# Patient Record
Sex: MALE | Race: WHITE | NOT HISPANIC OR LATINO | Employment: FULL TIME | URBAN - METROPOLITAN AREA
[De-identification: names, ages, dates, MRNs, and addresses within clinical notes are randomized per-mention and may not be internally consistent; named-entity substitution may affect disease eponyms.]

---

## 2021-01-12 ENCOUNTER — IMMUNIZATIONS (OUTPATIENT)
Dept: FAMILY MEDICINE CLINIC | Facility: HOSPITAL | Age: 36
End: 2021-01-12

## 2021-01-12 DIAGNOSIS — Z23 ENCOUNTER FOR IMMUNIZATION: ICD-10-CM

## 2021-01-12 PROCEDURE — 0011A SARS-COV-2 / COVID-19 MRNA VACCINE (MODERNA) 100 MCG: CPT

## 2021-01-12 PROCEDURE — 91301 SARS-COV-2 / COVID-19 MRNA VACCINE (MODERNA) 100 MCG: CPT

## 2021-02-07 ENCOUNTER — IMMUNIZATIONS (OUTPATIENT)
Dept: FAMILY MEDICINE CLINIC | Facility: HOSPITAL | Age: 36
End: 2021-02-07

## 2021-02-07 DIAGNOSIS — Z23 ENCOUNTER FOR IMMUNIZATION: Primary | ICD-10-CM

## 2021-02-07 PROCEDURE — 91301 SARS-COV-2 / COVID-19 MRNA VACCINE (MODERNA) 100 MCG: CPT

## 2021-02-07 PROCEDURE — 0012A SARS-COV-2 / COVID-19 MRNA VACCINE (MODERNA) 100 MCG: CPT

## 2021-05-25 ENCOUNTER — TRANSCRIBE ORDERS (OUTPATIENT)
Dept: ADMINISTRATIVE | Facility: HOSPITAL | Age: 36
End: 2021-05-25

## 2021-05-25 DIAGNOSIS — R59.0 ENLARGED LYMPH NODE IN NECK: Primary | ICD-10-CM

## 2021-06-02 ENCOUNTER — HOSPITAL ENCOUNTER (OUTPATIENT)
Dept: ULTRASOUND IMAGING | Facility: HOSPITAL | Age: 36
Discharge: HOME | End: 2021-06-02
Payer: COMMERCIAL

## 2021-06-02 DIAGNOSIS — R59.0 ENLARGED LYMPH NODE IN NECK: ICD-10-CM

## 2021-06-02 PROCEDURE — 76536 US EXAM OF HEAD AND NECK: CPT

## 2022-01-17 ENCOUNTER — PREP FOR PROCEDURE (OUTPATIENT)
Dept: OBGYN CLINIC | Facility: OTHER | Age: 37
End: 2022-01-17

## 2022-01-17 DIAGNOSIS — M16.12 PRIMARY OSTEOARTHRITIS OF LEFT HIP: Primary | ICD-10-CM

## 2022-02-14 ENCOUNTER — PREP FOR PROCEDURE (OUTPATIENT)
Dept: OBGYN CLINIC | Facility: OTHER | Age: 37
End: 2022-02-14

## 2022-02-14 DIAGNOSIS — M16.12 PRIMARY OSTEOARTHRITIS OF LEFT HIP: Primary | ICD-10-CM

## 2022-03-01 ENCOUNTER — ANESTHESIA EVENT (OUTPATIENT)
Dept: PERIOP | Facility: HOSPITAL | Age: 37
End: 2022-03-01
Payer: COMMERCIAL

## 2022-03-02 ENCOUNTER — ANESTHESIA (OUTPATIENT)
Dept: PERIOP | Facility: HOSPITAL | Age: 37
End: 2022-03-02
Payer: COMMERCIAL

## 2022-03-02 ENCOUNTER — HOSPITAL ENCOUNTER (OUTPATIENT)
Dept: RADIOLOGY | Facility: HOSPITAL | Age: 37
Setting detail: OUTPATIENT SURGERY
Discharge: HOME | End: 2022-03-02
Payer: COMMERCIAL

## 2022-03-02 ENCOUNTER — HOSPITAL ENCOUNTER (OUTPATIENT)
Facility: HOSPITAL | Age: 37
Setting detail: OUTPATIENT SURGERY
Discharge: HOME | End: 2022-03-03
Attending: ORTHOPAEDIC SURGERY | Admitting: ORTHOPAEDIC SURGERY
Payer: COMMERCIAL

## 2022-03-02 ENCOUNTER — APPOINTMENT (OUTPATIENT)
Dept: RADIOLOGY | Facility: HOSPITAL | Age: 37
End: 2022-03-02
Payer: COMMERCIAL

## 2022-03-02 DIAGNOSIS — M16.12 PRIMARY OSTEOARTHRITIS OF LEFT HIP: Primary | ICD-10-CM

## 2022-03-02 DIAGNOSIS — M16.12 PRIMARY OSTEOARTHRITIS OF LEFT HIP: ICD-10-CM

## 2022-03-02 LAB
ABO GROUP BLD: NORMAL
ABO GROUP BLD: NORMAL
BLD GP AB SCN SERPL QL: NEGATIVE
FLUAV RNA RESP QL NAA+PROBE: NEGATIVE
FLUBV RNA RESP QL NAA+PROBE: NEGATIVE
RH BLD: POSITIVE
RH BLD: POSITIVE
RSV RNA RESP QL NAA+PROBE: NEGATIVE
SARS-COV-2 RNA RESP QL NAA+PROBE: NEGATIVE
SPECIMEN EXPIRATION DATE: NORMAL

## 2022-03-02 PROCEDURE — 86900 BLOOD TYPING SEROLOGIC ABO: CPT | Performed by: ORTHOPAEDIC SURGERY

## 2022-03-02 PROCEDURE — 97163 PT EVAL HIGH COMPLEX 45 MIN: CPT

## 2022-03-02 PROCEDURE — C1776 JOINT DEVICE (IMPLANTABLE): HCPCS | Performed by: ORTHOPAEDIC SURGERY

## 2022-03-02 PROCEDURE — 73501 X-RAY EXAM HIP UNI 1 VIEW: CPT

## 2022-03-02 PROCEDURE — 73502 X-RAY EXAM HIP UNI 2-3 VIEWS: CPT

## 2022-03-02 PROCEDURE — 0241U HB NFCT DS VIR RESP RNA 4 TRGT: CPT | Performed by: ANESTHESIOLOGY

## 2022-03-02 PROCEDURE — 86850 RBC ANTIBODY SCREEN: CPT | Performed by: ORTHOPAEDIC SURGERY

## 2022-03-02 PROCEDURE — 86901 BLOOD TYPING SEROLOGIC RH(D): CPT | Performed by: ORTHOPAEDIC SURGERY

## 2022-03-02 DEVICE — BIOLOX DELTA CERAMIC FEMORAL HEAD +5.0 36MM DIA 12/14 TAPER
Type: IMPLANTABLE DEVICE | Site: HIP | Status: FUNCTIONAL
Brand: BIOLOX DELTA

## 2022-03-02 DEVICE — PINNACLE HIP SOLUTIONS ALTRX POLYETHYLENE ACETABULAR LINER NEUTRAL 36MM ID 54MM OD
Type: IMPLANTABLE DEVICE | Site: HIP | Status: FUNCTIONAL
Brand: PINNACLE ALTRX

## 2022-03-02 DEVICE — CORAIL HIP SYSTEM CEMENTLESS FEMORAL STEM 12/14 AMT 135 DEGREES KA SIZE 12 HA COATED STANDARD COLLAR
Type: IMPLANTABLE DEVICE | Site: HIP | Status: FUNCTIONAL
Brand: CORAIL

## 2022-03-02 DEVICE — PINNACLE POROCOAT ACETABULAR SHELL SECTOR II 54MM OD
Type: IMPLANTABLE DEVICE | Site: HIP | Status: FUNCTIONAL
Brand: PINNACLE POROCOAT

## 2022-03-02 RX ORDER — ASPIRIN 325 MG
325 TABLET ORAL 2 TIMES DAILY
Qty: 60 TABLET | Refills: 0
Start: 2022-03-02 | End: 2022-04-13

## 2022-03-02 RX ORDER — FENTANYL CITRATE/PF 50 MCG/ML
25 SYRINGE (ML) INJECTION
Status: DISCONTINUED | OUTPATIENT
Start: 2022-03-02 | End: 2022-03-02 | Stop reason: HOSPADM

## 2022-03-02 RX ORDER — ONDANSETRON 2 MG/ML
4 INJECTION INTRAMUSCULAR; INTRAVENOUS EVERY 6 HOURS PRN
Status: DISCONTINUED | OUTPATIENT
Start: 2022-03-02 | End: 2022-03-03 | Stop reason: HOSPADM

## 2022-03-02 RX ORDER — GLYCOPYRROLATE 0.2 MG/ML
INJECTION INTRAMUSCULAR; INTRAVENOUS AS NEEDED
Status: DISCONTINUED | OUTPATIENT
Start: 2022-03-02 | End: 2022-03-02

## 2022-03-02 RX ORDER — OXYCODONE HYDROCHLORIDE 5 MG/1
5 TABLET ORAL EVERY 4 HOURS PRN
Status: DISCONTINUED | OUTPATIENT
Start: 2022-03-02 | End: 2022-03-03 | Stop reason: HOSPADM

## 2022-03-02 RX ORDER — METOCLOPRAMIDE HYDROCHLORIDE 5 MG/ML
INJECTION INTRAMUSCULAR; INTRAVENOUS AS NEEDED
Status: DISCONTINUED | OUTPATIENT
Start: 2022-03-02 | End: 2022-03-02

## 2022-03-02 RX ORDER — ONDANSETRON 2 MG/ML
INJECTION INTRAMUSCULAR; INTRAVENOUS AS NEEDED
Status: DISCONTINUED | OUTPATIENT
Start: 2022-03-02 | End: 2022-03-02

## 2022-03-02 RX ORDER — CEFAZOLIN SODIUM 2 G/50ML
2000 SOLUTION INTRAVENOUS ONCE
Status: DISCONTINUED | OUTPATIENT
Start: 2022-03-02 | End: 2022-03-02 | Stop reason: HOSPADM

## 2022-03-02 RX ORDER — FERROUS SULFATE 325(65) MG
325 TABLET ORAL
Status: DISCONTINUED | OUTPATIENT
Start: 2022-03-03 | End: 2022-03-03 | Stop reason: HOSPADM

## 2022-03-02 RX ORDER — MIDAZOLAM HYDROCHLORIDE 2 MG/2ML
INJECTION, SOLUTION INTRAMUSCULAR; INTRAVENOUS AS NEEDED
Status: DISCONTINUED | OUTPATIENT
Start: 2022-03-02 | End: 2022-03-02

## 2022-03-02 RX ORDER — PROPOFOL 10 MG/ML
INJECTION, EMULSION INTRAVENOUS AS NEEDED
Status: DISCONTINUED | OUTPATIENT
Start: 2022-03-02 | End: 2022-03-02

## 2022-03-02 RX ORDER — ACETAMINOPHEN 325 MG/1
650 TABLET ORAL EVERY 6 HOURS PRN
Status: DISCONTINUED | OUTPATIENT
Start: 2022-03-02 | End: 2022-03-03 | Stop reason: HOSPADM

## 2022-03-02 RX ORDER — FENTANYL CITRATE 50 UG/ML
INJECTION, SOLUTION INTRAMUSCULAR; INTRAVENOUS AS NEEDED
Status: DISCONTINUED | OUTPATIENT
Start: 2022-03-02 | End: 2022-03-02

## 2022-03-02 RX ORDER — PROPOFOL 10 MG/ML
INJECTION, EMULSION INTRAVENOUS CONTINUOUS PRN
Status: DISCONTINUED | OUTPATIENT
Start: 2022-03-02 | End: 2022-03-02

## 2022-03-02 RX ORDER — SODIUM CHLORIDE 9 MG/ML
INJECTION, SOLUTION INTRAVENOUS AS NEEDED
Status: DISCONTINUED | OUTPATIENT
Start: 2022-03-02 | End: 2022-03-02 | Stop reason: HOSPADM

## 2022-03-02 RX ORDER — ONDANSETRON 2 MG/ML
4 INJECTION INTRAMUSCULAR; INTRAVENOUS ONCE AS NEEDED
Status: DISCONTINUED | OUTPATIENT
Start: 2022-03-02 | End: 2022-03-02 | Stop reason: HOSPADM

## 2022-03-02 RX ORDER — SODIUM CHLORIDE 9 MG/ML
125 INJECTION, SOLUTION INTRAVENOUS CONTINUOUS
Status: DISCONTINUED | OUTPATIENT
Start: 2022-03-02 | End: 2022-03-03 | Stop reason: HOSPADM

## 2022-03-02 RX ORDER — CEFAZOLIN SODIUM 2 G/50ML
SOLUTION INTRAVENOUS AS NEEDED
Status: DISCONTINUED | OUTPATIENT
Start: 2022-03-02 | End: 2022-03-02

## 2022-03-02 RX ORDER — KETOROLAC TROMETHAMINE 30 MG/ML
15 INJECTION, SOLUTION INTRAMUSCULAR; INTRAVENOUS EVERY 6 HOURS PRN
Status: DISCONTINUED | OUTPATIENT
Start: 2022-03-02 | End: 2022-03-03 | Stop reason: HOSPADM

## 2022-03-02 RX ORDER — TRANEXAMIC ACID 100 MG/ML
INJECTION, SOLUTION INTRAVENOUS AS NEEDED
Status: DISCONTINUED | OUTPATIENT
Start: 2022-03-02 | End: 2022-03-02

## 2022-03-02 RX ORDER — SODIUM CHLORIDE, SODIUM LACTATE, POTASSIUM CHLORIDE, CALCIUM CHLORIDE 600; 310; 30; 20 MG/100ML; MG/100ML; MG/100ML; MG/100ML
100 INJECTION, SOLUTION INTRAVENOUS CONTINUOUS
Status: DISCONTINUED | OUTPATIENT
Start: 2022-03-02 | End: 2022-03-03 | Stop reason: HOSPADM

## 2022-03-02 RX ORDER — SENNOSIDES 8.6 MG
1 TABLET ORAL DAILY
Status: DISCONTINUED | OUTPATIENT
Start: 2022-03-02 | End: 2022-03-03 | Stop reason: HOSPADM

## 2022-03-02 RX ORDER — DOCUSATE SODIUM 100 MG/1
100 CAPSULE, LIQUID FILLED ORAL 2 TIMES DAILY
Status: DISCONTINUED | OUTPATIENT
Start: 2022-03-02 | End: 2022-03-03 | Stop reason: HOSPADM

## 2022-03-02 RX ORDER — GLYCOPYRROLATE 0.2 MG/ML
0.4 INJECTION INTRAMUSCULAR; INTRAVENOUS ONCE
Status: COMPLETED | OUTPATIENT
Start: 2022-03-02 | End: 2022-03-02

## 2022-03-02 RX ORDER — BUPIVACAINE HYDROCHLORIDE 7.5 MG/ML
INJECTION, SOLUTION INTRASPINAL AS NEEDED
Status: DISCONTINUED | OUTPATIENT
Start: 2022-03-02 | End: 2022-03-02

## 2022-03-02 RX ORDER — HYDROMORPHONE HCL/PF 1 MG/ML
0.5 SYRINGE (ML) INJECTION EVERY 2 HOUR PRN
Status: DISCONTINUED | OUTPATIENT
Start: 2022-03-02 | End: 2022-03-03 | Stop reason: HOSPADM

## 2022-03-02 RX ORDER — CEFAZOLIN SODIUM 1 G/50ML
1000 SOLUTION INTRAVENOUS EVERY 8 HOURS
Status: COMPLETED | OUTPATIENT
Start: 2022-03-02 | End: 2022-03-03

## 2022-03-02 RX ORDER — ACETAMINOPHEN 325 MG/1
650 TABLET ORAL EVERY 6 HOURS PRN
Refills: 0
Start: 2022-03-02

## 2022-03-02 RX ORDER — PANTOPRAZOLE SODIUM 40 MG/1
40 TABLET, DELAYED RELEASE ORAL
Status: DISCONTINUED | OUTPATIENT
Start: 2022-03-03 | End: 2022-03-03 | Stop reason: HOSPADM

## 2022-03-02 RX ORDER — FERROUS SULFATE 325(65) MG
325 TABLET ORAL
Refills: 0
Start: 2022-03-03

## 2022-03-02 RX ORDER — DOCUSATE SODIUM 100 MG/1
100 CAPSULE, LIQUID FILLED ORAL 2 TIMES DAILY
Refills: 0
Start: 2022-03-02

## 2022-03-02 RX ORDER — ASPIRIN 325 MG
325 TABLET ORAL 2 TIMES DAILY
Status: DISCONTINUED | OUTPATIENT
Start: 2022-03-02 | End: 2022-03-03 | Stop reason: HOSPADM

## 2022-03-02 RX ORDER — OXYCODONE HYDROCHLORIDE 10 MG/1
10 TABLET ORAL EVERY 4 HOURS PRN
Status: DISCONTINUED | OUTPATIENT
Start: 2022-03-02 | End: 2022-03-03 | Stop reason: HOSPADM

## 2022-03-02 RX ORDER — DEXAMETHASONE SODIUM PHOSPHATE 4 MG/ML
INJECTION, SOLUTION INTRA-ARTICULAR; INTRALESIONAL; INTRAMUSCULAR; INTRAVENOUS; SOFT TISSUE AS NEEDED
Status: DISCONTINUED | OUTPATIENT
Start: 2022-03-02 | End: 2022-03-02

## 2022-03-02 RX ORDER — OXYCODONE HYDROCHLORIDE 5 MG/1
5 TABLET ORAL EVERY 4 HOURS PRN
Refills: 0
Start: 2022-03-02 | End: 2022-03-12

## 2022-03-02 RX ADMIN — PROPOFOL 20 MG: 10 INJECTION, EMULSION INTRAVENOUS at 10:25

## 2022-03-02 RX ADMIN — SODIUM CHLORIDE 125 ML/HR: 0.9 INJECTION, SOLUTION INTRAVENOUS at 09:11

## 2022-03-02 RX ADMIN — SODIUM CHLORIDE: 0.9 INJECTION, SOLUTION INTRAVENOUS at 11:00

## 2022-03-02 RX ADMIN — ASPIRIN 325 MG ORAL TABLET 325 MG: 325 PILL ORAL at 17:00

## 2022-03-02 RX ADMIN — PROPOFOL 100 MCG/KG/MIN: 10 INJECTION, EMULSION INTRAVENOUS at 10:51

## 2022-03-02 RX ADMIN — HYDROMORPHONE HYDROCHLORIDE 0.5 MG: 1 INJECTION, SOLUTION INTRAMUSCULAR; INTRAVENOUS; SUBCUTANEOUS at 18:29

## 2022-03-02 RX ADMIN — METOCLOPRAMIDE 5 MG: 5 INJECTION, SOLUTION INTRAMUSCULAR; INTRAVENOUS at 10:34

## 2022-03-02 RX ADMIN — GLYCOPYRROLATE 0.4 MG: 0.2 INJECTION, SOLUTION INTRAMUSCULAR; INTRAVENOUS at 12:56

## 2022-03-02 RX ADMIN — DOCUSATE SODIUM 100 MG: 100 CAPSULE ORAL at 17:00

## 2022-03-02 RX ADMIN — CEFAZOLIN SODIUM 2000 MG: 2 SOLUTION INTRAVENOUS at 10:30

## 2022-03-02 RX ADMIN — OXYCODONE HYDROCHLORIDE 5 MG: 5 TABLET ORAL at 16:57

## 2022-03-02 RX ADMIN — FENTANYL CITRATE 10 MCG: 50 INJECTION INTRAMUSCULAR; INTRAVENOUS at 10:30

## 2022-03-02 RX ADMIN — GLYCOPYRROLATE 0.2 MG: 0.2 INJECTION, SOLUTION INTRAMUSCULAR; INTRAVENOUS at 10:34

## 2022-03-02 RX ADMIN — ONDANSETRON 4 MG: 2 INJECTION INTRAMUSCULAR; INTRAVENOUS at 12:25

## 2022-03-02 RX ADMIN — FENTANYL CITRATE 190 MCG: 50 INJECTION INTRAMUSCULAR; INTRAVENOUS at 10:19

## 2022-03-02 RX ADMIN — OXYCODONE HYDROCHLORIDE 10 MG: 10 TABLET ORAL at 20:47

## 2022-03-02 RX ADMIN — BUPIVACAINE HYDROCHLORIDE IN DEXTROSE 1.8 ML: 7.5 INJECTION, SOLUTION SUBARACHNOID at 10:30

## 2022-03-02 RX ADMIN — SODIUM CHLORIDE, SODIUM LACTATE, POTASSIUM CHLORIDE, AND CALCIUM CHLORIDE 100 ML/HR: .6; .31; .03; .02 INJECTION, SOLUTION INTRAVENOUS at 15:11

## 2022-03-02 RX ADMIN — SODIUM CHLORIDE, SODIUM LACTATE, POTASSIUM CHLORIDE, AND CALCIUM CHLORIDE 1000 ML: .6; .31; .03; .02 INJECTION, SOLUTION INTRAVENOUS at 12:58

## 2022-03-02 RX ADMIN — TRANEXAMIC ACID 1 G: 1 INJECTION, SOLUTION INTRAVENOUS at 10:41

## 2022-03-02 RX ADMIN — PROPOFOL 30 MG: 10 INJECTION, EMULSION INTRAVENOUS at 10:51

## 2022-03-02 RX ADMIN — CEFAZOLIN SODIUM 1000 MG: 1 SOLUTION INTRAVENOUS at 17:52

## 2022-03-02 RX ADMIN — MIDAZOLAM 4 MG: 1 INJECTION INTRAMUSCULAR; INTRAVENOUS at 10:19

## 2022-03-02 RX ADMIN — DEXAMETHASONE SODIUM PHOSPHATE 4 MG: 4 INJECTION INTRA-ARTICULAR; INTRALESIONAL; INTRAMUSCULAR; INTRAVENOUS; SOFT TISSUE at 10:38

## 2022-03-02 NOTE — ANESTHESIA PROCEDURE NOTES
Spinal Block    Patient location during procedure: OR  Start time: 3/2/2022 10:30 AM  Staffing  Performed: CRNA   Spinal Block  Patient position: sitting  Prep: Betadine  Patient monitoring: heart rate, continuous pulse ox and frequent blood pressure checks  Approach: midline  Location: L3-4  Injection technique: single-shot  Needle  Needle type: pencil-tip   Needle gauge: 24 G  Needle length: 10 cm  Assessment  Sensory level: T4  Injection Assessment:  negative aspiration for heme, no paresthesia on injection and positive aspiration for clear CSF

## 2022-03-02 NOTE — OP NOTE
Left Anterior Total Hip Procedure Note    Patient Name: Danii Story  MRN: [id number]  Date of Surgery 3/2/2022        Indications: Degenerative joint disease left hip with failed conservative care  Pre-operative Diagnosis: Left hip degenerative joint disease  Post-operative Diagnosis: Left hip degenerative joint disease  Surgeon: Claudio Linder MD [facility]    Assistant: Nakita Robledo [facility]    Anesthesia:  Procedure(s) and Anesthesia Type:     * ANTERIOR TOTAL HIP REPLACEMENT - Choice  Operative procedure: Left total hip arthroplasty, anterior approach    Implants:   Implant Name Type Inv  Item Serial No   Lot No  LRB No  Used Action   SHELL ACETABULAR 54MM POROUS SOLID LCK RING PINNACLE - [id number]  SHELL ACETABULAR 54MM POROUS SOLID LCK RING PINNACLE  DEPUY [id number] Left 1 Implanted   LINER ACTB ULT LNK PE 36 X 54MM 0DEG NEUTRAL ALTRX - [id number]  LINER ACTB ULT LNK PE 36 X 54MM 0DEG NEUTRAL ALTRX  DEPUY [id number] Left 1 Implanted   STEM FEM PRESS FIT 12/14 TAPER SZ 12 COLR STD CORAIL - [id number]  STEM FEM PRESS FIT 12/14 TAPER SZ 12 COLR STD CORAIL  DEPUY [id number] Left 1 Implanted   HEAD FEMORAL CMNTLS 12/14 TPR 36MM +5MM BIOLOX DELTA ARTICULEZE - [id number]  HEAD FEMORAL CMNTLS 12/14 TPR 36MM +5MM BIOLOX DELTA ARTICULEZE  DEPUY [id number] Left 1 Implanted     Ceramichead on Polyethelene, cementless    Drains: None    Estimated blood loss: 200cc    Antibiotics: Given preoperatively    Clinical note: Danii Story is a 30 y o  male who presents with severe left hip pain and disability  Physical exam investigations was consistent with degenerative joint disease  The patient been treated nonoperatively and failed to improve  Nonoperative versus operative options reviewed  The patient did understand the situation and did wish to proceed with operative management    Description of procedure: The patient was identified as Danii Story and the left hip as the surgical site  Anesthesia was administered  The patient was appropriately padded and placed on the [place] table for hip surgery  Utilizing a anterior approach, sharp dissection was carried down through skin  Hemostasis was obtained using electrocautery followed by division of the fascia overlying tensor fascia swapna  The tensor fascia swapna and abductors were retracted laterally  The lateral femoral circumflex vessels were identified and electrocoagulated  The hip capsule was identified and appropriate retractors were placed  A capsulotomy was performed and end-stage degenerative changes within the hip joint were confirmed  Under fluoroscopic control, a femoral neck cut was made and the femoral head and neck was removed  Loss of articular cartilage with osteophyte formation was again confirmed  Under direct vision as well as with the aid of fluoroscopic control, the acetabulum was serially reamed to a bleeding bony bed  Prominent osteophytes were removed  Under fluoroscopic control a [place] sector acetabulum was then impacted in place with approximately 40° of abduction and 20° of anteversion  A neutral polyethylene liner was then impacted into the acetabular shell with good fixation  Appropriate retractors were placed along the proximal femur and appropriate soft tissue releases were performed  The femur was elevated out of the wound to gain access to the femoral canal   The femoral canal was opened up with a box osteotome and rongeur followed by appropriate rasp and broaches  A trial reduction was carried out and the appropriate head and neck size was identified both under direct vision as well as with fluoroscopic imaging  The hip was noted to be stable with extreme positioning  The trial components were removed at the appropriate time after copious irrigation of the wound, the final femoral stem was impacted in place with excellent fixation    Trial reduction with various head sizes was carried out and the appropriate head size selected and impacted on the [facility] CARE [facility]RADE taper  The hip was reduced for the last time again demonstrating excellent range of motion stability with leg lengths being estimated to be near equal   Final imaging was obtained with the fluoroscopy and kept his hard copy  Hemostasis was inspected and found to be satisfactory followed by irrigation with pulse lavage and closure utilizing 2-0 Vicryl and stratafix for deep layers, 2-0 Vicryl  For subcutaneous closure and a 3 Monocryl subcutaneous stitch for skin  Steri-Strips were applied  A soft absorbent bandage was added and the patient was then transferred to the stretcher in the supine position  There were no complications  Throughout the procedure, assistance by Diana Pierce PA-C was required  She was required to aid in positioning the patient preoperatively and intraoperatively she was required to manipulate the patient's left lower extremity as well as various retractors and other equipment under my guidance  On completion of procedure, she was required to aid in closure of the wound and application of bandage  She was also required to aid in transfer the patient to recovery  AP hip, AP pelvis and and lateral views of the left hip were obtained intraoperatively  This demonstrated appropriate positioning a left total hip arthroplasty components  There was no evidence loosening or failure  There was air in the soft tissues consistent with intraoperative status the radiographs        Date: 3/2/2022  Time: 12:21 PM    Nahed Ocasio MD [facility]

## 2022-03-02 NOTE — PLAN OF CARE
Problem: MOBILITY - ADULT  Goal: Maintain or return to baseline ADL function  Description: INTERVENTIONS:  -  Assess patient's ability to carry out ADLs; assess patient's baseline for ADL function and identify physical deficits which impact ability to perform ADLs (bathing, care of mouth/teeth, toileting, grooming, dressing, etc )  - Assess/evaluate cause of self-care deficits   - Assess range of motion  - Assess patient's mobility; develop plan if impaired  - Assess patient's need for assistive devices and provide as appropriate  - Encourage maximum independence but intervene and supervise when necessary  - Involve family in performance of ADLs  - Assess for home care needs following discharge   - Consider OT consult to assist with ADL evaluation and planning for discharge  - Provide patient education as appropriate  Outcome: Progressing  Goal: Maintains/Returns to pre admission functional level  Description: INTERVENTIONS:  - Perform BMAT or MOVE assessment daily    - Set and communicate daily mobility goal to care team and patient/family/caregiver  - Collaborate with rehabilitation services on mobility goals if consulted  - Perform Range of Motion 3   Problem: Potential for Falls  Goal: Patient will remain free of falls  Description: INTERVENTIONS:  - Educate patient/family on patient safety including physical limitations  - Instruct patient to call for assistance with activity   - Consult OT/PT to assist with strengthening/mobility   - Keep Call bell within reach  - Keep bed low and locked with side rails adjusted as appropriate  - Keep care items and personal belongings within reach  - Initiate and maintain comfort rounds  - Make Fall Risk Sign visible to staff  - Apply yellow socks and bracelet for high fall risk patients  - Consider moving patient to room near nurses station  Outcome: Progressing    times a day  - Reposition patient every 3  hours    - Dangle patient 3 times a day  - Stand patient 3 times a day  - Ambulate patient 3  times a day  - Out of bed to chair 3  times a day   - Out of bed for meals 3  times a day  - Out of bed for toileting  - Record patient progress and toleration of activity level   Outcome: Progressing

## 2022-03-02 NOTE — ANESTHESIA PREPROCEDURE EVALUATION
Procedure:  ANTERIOR TOTAL HIP REPLACEMENT (Left Hip)    Relevant Problems   No relevant active problems        Physical Exam    Airway    Mallampati score: II  TM Distance: >3 FB  Neck ROM: full     Dental       Cardiovascular  Rhythm: regular, Rate: normal, Cardiovascular exam normal    Pulmonary  Pulmonary exam normal Breath sounds clear to auscultation,     Other Findings        Anesthesia Plan  ASA Score- 2     Anesthesia Type- spinal with ASA Monitors  Additional Monitors:   Airway Plan:           Plan Factors-Exercise tolerance (METS): >4 METS  Chart reviewed  Existing labs reviewed  Patient is not a current smoker  Obstructive sleep apnea risk education given perioperatively  Induction- intravenous  Postoperative Plan-     Informed Consent- Anesthetic plan and risks discussed with patient

## 2022-03-02 NOTE — PHYSICAL THERAPY NOTE
PHYSICAL THERAPY EVALUATION          Patient Name: Mehdi Carreon  PCBPH'J Date: 3/2/2022   PT EVALUATION [phone]    30 y o     [id number]    Primary osteoarthritis of left hip [M16 12]    Past Medical History:   Diagnosis Date    GERD (gastroesophageal reflux disease)        Past Surgical History:   Procedure Laterality Date    HIP ARTHROSCOPY Left     MICRODISCECTOMY LUMBAR  01/2021 03/02/22 1716   PT Last Visit   PT Visit Date 03/02/22   Note Type   Note type Evaluation   Pain Assessment   Pain Assessment Tool 0-10   Pain Score 6   Pain Location/Orientation Orientation: Left; Location: Hip   Hospital Pain Intervention(s) Cold applied;Repositioned; Ambulation/increased activity; Emotional support; Rest   Restrictions/Precautions   Weight Bearing Precautions Per Order Yes   LLE Weight Bearing Per Order WBAT   Other Precautions Multiple lines; Fall Risk;Pain   Home Living   Type of [address] [place] [address] Av Two level;Bed/bath upstairs;Stairs to enter without rails   Bathroom Shower/Tub Walk-in shower  (on first floor; tub on second floor)   [address] bars in 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   Comments indep at baseline without an AD  spouse will be home and able to assist  +  General   Additional Pertinent History POD#0 L ant THR  activity day #0 per ortho      Family/Caregiver Present Yes   Cognition   Overall Cognitive Status WFL   Arousal/Participation Cooperative   Orientation Level Oriented X4   Memory Within functional limits   Following Commands Follows all commands and directions without difficulty   Subjective   Subjective agreeable to PT   RLE Assessment   RLE Assessment WFL   LLE Assessment   LLE Assessment WFL  (pain limited)   Coordination   Sensation X  (some residual numbness proximal LLE)   Light Touch   RLE Light Touch Grossly intact   LLE Light Touch Grossly intact   Bed Mobility   Supine to Sit 5  Supervision   Additional items HOB elevated; Bedrails; Increased time required   Sit to Supine 4  Minimal assistance   Additional items Assist x 1;HOB elevated; Bedrails; Increased time required;LE management   Transfers   Sit to Stand 5  Supervision   Additional items Bedrails; Increased time required;Verbal cues; Other  (RW)   Stand to Sit 5  Supervision   Additional items Bedrails; Increased time required;Verbal cues; Other  (RW)   Additional Comments cues for hand placement, position of LLE for comfort   Ambulation/Elevation   Gait pattern Improper Weight shift; Wide TITO; Short stride; Excessively slow   Gait Assistance 5  Supervision   Additional items Assist x 1   Assistive Device Rolling walker   Distance 40'   Balance   Static Standing Fair   Dynamic Standing Fair -   Ambulatory Fair -   Endurance Deficit   Endurance Deficit Yes   Endurance Deficit Description pt reporting some lightheadedness  during session  vitals recorded as 112/78, 64 at EOB pre amb  105/66, 52 supine end of session   Activity Tolerance   Activity Tolerance Patient tolerated treatment well;Treatment limited secondary to medical complications (Comment); Patient limited by pain  (lightheadedness)   Nurse Made Aware Flor SKINNER   Assessment   Prognosis Good   Problem List Decreased strength; Impaired balance;Decreased mobility; Decreased endurance;Decreased skin integrity;Pain   Assessment Mariah Doll is a 30 y o  male admitted to [facility] on 3/2/2022 for Primary osteoarthritis of left hip  POD#0 L ant THR   PT was consulted and pt was seen on 3/2/2022 for mobility assessment and d/c planning  Pt presents w multiple lines, fall risk, acute post op pain  At baseline is indep without an AD  Pt is primarily functioning at S level for bed mobility, transfers and ambulation w RW  Did require minimal assistance to lift LLE into bed  Pt demonstrated good follow through of mobility training throughout session, including safe transf technique and gait sequencing w RW  Able to ambulate limited household distances  Currently activity tolerance limited by pain and mild lightheadedness  Vitals stable during session and sx improve w rest  Pt will benefit from continued skilled IP PT to address the above mentioned impairments  in order to maximize recovery and increase functional independence when completing mobility and ADLs  At this time PT recommendations for d/c are home w OPPT pending stair trial    Barriers to Discharge Inaccessible home environment   Barriers to Discharge Comments steps   Goals   Patient Goals get back to firefighting, [facility], go to the gym   STG Expiration Date 03/05/22   Short Term Goal #1 1)  Pt will perform bed mobility with Karlo demonstrating appropriate technique 100% of the time in order to improve function  2)  Perform all transfers with Karlo demonstrating safe and appropriate technique 100% of the time in order to improve ability to negotiate safely in home environment  3) Amb with least restrictive AD > 150'x1 with mod I in order to demonstrate ability to negotiate in home environment  4)  Improve overall strength and balance 1/2 grade in order to optimize ability to perform functional tasks and reduce fall risk  5) Increase activity tolerance to 45 minutes in order to improve endurance to functional tasks  6)  Negotiate stairs using most appropriate technique and mod I in order to be able to negotiate safely in home environment   7) PT for ongoing patient and family/caregiver education, DME needs and d/c planning in order to promote highest level of function in least restrictive environment  Plan   Treatment/Interventions Functional transfer training;LE strengthening/ROM; Elevations; Therapeutic exercise; Endurance training;Patient/family training;Equipment eval/education; Bed mobility;Gait training; Compensatory technique education;Continued evaluation;Spoke to nursing;Family   PT Frequency Twice a day; Other (Comment)  (QID-BID)   Recommendation   PT Discharge Recommendation Home with outpatient rehabilitation   Additional Comments The patient's AM-PAC Basic Mobility Inpatient Short Form Raw Score is 18  A Raw score of greater than 16 suggests the patient may benefit from discharge to home  Please also refer to the recommendation of the Physical Therapist for safe discharge planning     AM-PAC Basic Mobility Inpatient   Turning in Bed Without Bedrails 3   Lying on Back to Sitting on Edge of Flat Bed 3   Moving Bed to Chair 3   Standing Up From Chair 3   Walk in Room 3   Climb 3-5 Stairs 3   Basic Mobility Inpatient Raw Score 18   Basic Mobility Standardized Score 41 05   Highest Level Of Mobility   JH-HLM Goal 6: Walk 10 steps or more   JH-HLM Highest Level of Mobility 7: Walk 25 feet or more   JH-HLM Goal Achieved Yes   History: social background (steps), fall risk, multiple lines  Exam: impairments in systems including musculoskeletal (strength), neuromuscular (balance, gait, transfers, motor function and sensation), am-pac, cardiopulmonary, cognition  Clinical: unstable/unpredictable  Complexity:high      Peggy Giron, PT

## 2022-03-02 NOTE — PLAN OF CARE
Problem: PHYSICAL THERAPY ADULT  Goal: Performs mobility at highest level of function for planned discharge setting  See evaluation for individualized goals  Description: Treatment/Interventions: Functional transfer training,LE strengthening/ROM,Elevations,Therapeutic exercise,Endurance training,Patient/family training,Equipment eval/education,Bed mobility,Gait training,Compensatory technique education,Continued evaluation,Spoke to nursing,Family          See flowsheet documentation for full assessment, interventions and recommendations  3/2/2022 1729 by Arti Ellis PT  Note: Prognosis: Good  Problem List: Decreased strength,Impaired balance,Decreased mobility,Decreased endurance,Decreased skin integrity,Pain  Assessment: Michael Rowley is a 30 y o  male admitted to [address] [facility] on 3/2/2022 for Primary osteoarthritis of left hip  POD#0 L ant THR  PT was consulted and pt was seen on 3/2/2022 for mobility assessment and d/c planning  Pt presents w multiple lines, fall risk, acute post op pain  At baseline is indep without an AD  Pt is primarily functioning at S level for bed mobility, transfers and ambulation w RW  Did require minimal assistance to lift LLE into bed  Pt demonstrated good follow through of mobility training throughout session, including safe transf technique and gait sequencing w RW  Able to ambulate limited household distances  Currently activity tolerance limited by pain and mild lightheadedness  Vitals stable during session and sx improve w rest  Pt will benefit from continued skilled IP PT to address the above mentioned impairments  in order to maximize recovery and increase functional independence when completing mobility and ADLs   At this time PT recommendations for d/c are home w OPPT pending stair trial   Barriers to Discharge: Inaccessible home environment  Barriers to Discharge Comments: steps     PT Discharge Recommendation: Home with outpatient rehabilitation          See flowsheet documentation for full assessment  3/2/2022 1729 by Aiden Dumont, PT  Note: Prognosis: Good  Problem List: Decreased strength,Impaired balance,Decreased mobility,Decreased endurance,Decreased skin integrity,Pain  Assessment: Lexie Ya is a 30 y o  male admitted to [address] on 3/2/2022 for Primary osteoarthritis of left hip  POD#0 L ant THR  PT was consulted and pt was seen on 3/2/2022 for mobility assessment and d/c planning  Pt presents w multiple lines, fall risk, acute post op pain  At baseline is indep without an AD  Pt is primarily functioning at S level for bed mobility, transfers and ambulation w RW  Did require minimal assistance to lift LLE into bed  Pt demonstrated good follow through of mobility training throughout session, including safe transf technique and gait sequencing w RW  Able to ambulate limited household distances  Currently activity tolerance limited by pain and mild lightheadedness  Vitals stable during session and sx improve w rest  Pt will benefit from continued skilled IP PT to address the above mentioned impairments  in order to maximize recovery and increase functional independence when completing mobility and ADLs  At this time PT recommendations for d/c are home w OPPT pending stair trial   Barriers to Discharge: Inaccessible home environment  Barriers to Discharge Comments: steps     PT Discharge Recommendation: Home with outpatient rehabilitation          See flowsheet documentation for full assessment

## 2022-03-02 NOTE — ANESTHESIA POSTPROCEDURE EVALUATION
Post-Op Assessment Note    CV Status:  Stable    Pain management: adequate     Mental Status:  Alert and awake   Hydration Status:  Euvolemic   PONV Controlled:  Controlled   Airway Patency:  Patent      Post Op Vitals Reviewed: Yes      Staff: Anesthesiologist         No complications documented      BP      Temp      Pulse     Resp      SpO2      /59   Pulse 64   Temp 97 9 °F (36 6 °C)   Resp 17   Ht 5' 10" (1 778 m)   Wt 79 9 kg (176 lb 2 4 oz)   SpO2 99%   BMI 25 27 kg/m²

## 2022-03-03 VITALS
TEMPERATURE: 98 F | DIASTOLIC BLOOD PRESSURE: 60 MMHG | BODY MASS INDEX: 25.22 KG/M2 | RESPIRATION RATE: 18 BRPM | OXYGEN SATURATION: 100 % | WEIGHT: 176.15 LBS | HEART RATE: 78 BPM | SYSTOLIC BLOOD PRESSURE: 125 MMHG | HEIGHT: 70 IN

## 2022-03-03 LAB
ANION GAP SERPL CALCULATED.3IONS-SCNC: 7 MMOL/L (ref 4–13)
BUN SERPL-MCNC: 15 MG/DL (ref 5–25)
CALCIUM SERPL-MCNC: 8.3 MG/DL (ref 8.3–10.1)
CHLORIDE SERPL-SCNC: 105 MMOL/L (ref 100–108)
CO2 SERPL-SCNC: 28 MMOL/L (ref 21–32)
CREAT SERPL-MCNC: 0.98 MG/DL (ref 0.6–1.3)
ERYTHROCYTE [DISTWIDTH] IN BLOOD BY AUTOMATED COUNT: 12.5 % (ref 11.6–15.1)
GFR SERPL CREATININE-BSD FRML MDRD: 99 ML/MIN/1.73SQ M
GLUCOSE SERPL-MCNC: 114 MG/DL (ref 65–140)
HCT VFR BLD AUTO: 34.3 % (ref 36.5–49.3)
HGB BLD-MCNC: 11.6 G/DL (ref 12–17)
MCH RBC QN AUTO: 30.1 PG (ref 26.8–34.3)
MCHC RBC AUTO-ENTMCNC: 33.8 G/DL (ref 31.4–37.4)
MCV RBC AUTO: 89 FL (ref 82–98)
PLATELET # BLD AUTO: 241 THOUSANDS/UL (ref 149–390)
PMV BLD AUTO: 10.4 FL (ref 8.9–12.7)
POTASSIUM SERPL-SCNC: 3.7 MMOL/L (ref 3.5–5.3)
RBC # BLD AUTO: 3.86 MILLION/UL (ref 3.88–5.62)
SODIUM SERPL-SCNC: 140 MMOL/L (ref 136–145)
WBC # BLD AUTO: 13.41 THOUSAND/UL (ref 4.31–10.16)

## 2022-03-03 PROCEDURE — 85027 COMPLETE CBC AUTOMATED: CPT | Performed by: PHYSICIAN ASSISTANT

## 2022-03-03 PROCEDURE — 97110 THERAPEUTIC EXERCISES: CPT

## 2022-03-03 PROCEDURE — 80048 BASIC METABOLIC PNL TOTAL CA: CPT | Performed by: PHYSICIAN ASSISTANT

## 2022-03-03 PROCEDURE — 97535 SELF CARE MNGMENT TRAINING: CPT

## 2022-03-03 PROCEDURE — 97166 OT EVAL MOD COMPLEX 45 MIN: CPT

## 2022-03-03 RX ADMIN — FERROUS SULFATE TAB 325 MG (65 MG ELEMENTAL FE) 325 MG: 325 (65 FE) TAB at 07:58

## 2022-03-03 RX ADMIN — OXYCODONE HYDROCHLORIDE 10 MG: 10 TABLET ORAL at 07:56

## 2022-03-03 RX ADMIN — ASPIRIN 325 MG ORAL TABLET 325 MG: 325 PILL ORAL at 08:04

## 2022-03-03 RX ADMIN — OXYCODONE HYDROCHLORIDE 10 MG: 10 TABLET ORAL at 12:46

## 2022-03-03 RX ADMIN — SODIUM CHLORIDE 500 ML: 9 INJECTION, SOLUTION INTRAVENOUS at 08:07

## 2022-03-03 RX ADMIN — OXYCODONE HYDROCHLORIDE 10 MG: 10 TABLET ORAL at 16:45

## 2022-03-03 RX ADMIN — ACETAMINOPHEN 650 MG: 325 TABLET, FILM COATED ORAL at 16:06

## 2022-03-03 RX ADMIN — CEFAZOLIN SODIUM 1000 MG: 1 SOLUTION INTRAVENOUS at 02:30

## 2022-03-03 RX ADMIN — PANTOPRAZOLE SODIUM 40 MG: 40 TABLET, DELAYED RELEASE ORAL at 07:57

## 2022-03-03 RX ADMIN — HYDROMORPHONE HYDROCHLORIDE 0.5 MG: 1 INJECTION, SOLUTION INTRAMUSCULAR; INTRAVENOUS; SUBCUTANEOUS at 00:01

## 2022-03-03 RX ADMIN — SODIUM CHLORIDE, SODIUM LACTATE, POTASSIUM CHLORIDE, AND CALCIUM CHLORIDE 100 ML/HR: .6; .31; .03; .02 INJECTION, SOLUTION INTRAVENOUS at 00:24

## 2022-03-03 NOTE — PROGRESS NOTES
Progress Note - Internal Medicine   Dionne Galaviz 30 y o  male MRN: [id number]  Unit/Bed#: E2 -01 Encounter: [id number]      Impression :    POD #1, elective left hip replacement  Left hip degenerative joint disease  Ambulatory dysfunction  Bilateral hip dysplasia  Postoperative volume depletion  Dehydration/mild hypotension      Recommendation:    · Patient appears to be volume depleted due to poor intake  Continue IV fluids and give fluid boluses based on his low blood pressure  · Monitor for orthostatic blood pressure drops and use Rudy stocking and orthostatic reconditioning  Continue to drink more fluid  · Continue DVT prophylaxis as per surgical team with  mg PO BID  · Monitor for any redness/ drainage / swelling around site of surgery and to notify Orthopedic team or PCP  · Recommend life style modifications and any high impact activities  · Continue PT /OT to improve endurance, range of motion, gait stabilization and use of assist device in a safe manner  · Recheck Hemoglobin and hematocrit  · Full fall and orthostatic precautions  Subjective:     Patient seen and examined today  EMR and overnight events reviewed  Patient is resting in bed  States that he feels dry and thirsty  He has not tried much to get out of bed  Last night he did sit up on the site  Denies any chest pain nausea vomiting  Mild pain rated 4/10 on numeric pain scale over his operated left hip  Denies any bleeding discharge or swelling of the leg  Review of Systems     Constitutional: Denies appetite change  No chills, diaphoresis, fatigue or fever  HEENT: No congestion, sore throat  No visual complaints  Respiratory: No cough, chest tightness, shortness of breath and wheezing  Cardiovascular: No chest pain and palpitations  No Syncope or grey out  GI: Negative for abdominal distention, pain, constipation, diarrhea or nausea     Endocrine: Negative for cold or heat intolerance, polydipsia and polyuria  Genitourinary: Negative for decreased urine volume, dysuria, flank pain and urgency  Skin: Negative for rash  Neurological: Negative for dizziness, weakness, numbness and headaches  Hematological: Negative for spontaneous bruising or bleeding  Psychiatric: Negative for confusion, dysphoric mood, hallucinations  All other systems reviewed and are negative  OBJECTIVE:     Vitals:     Temp:  [97 8 °F (36 6 °C)-98 3 °F (36 8 °C)] 98 3 °F (36 8 °C)  HR:  [40-80] 80  Resp:  [13-18] 16  BP: ()/(48-69) 111/63  SpO2:  [92 %-100 %] 92 %  Temp (24hrs), Av 1 °F (36 7 °C), Min:97 8 °F (36 6 °C), Max:98 3 °F (36 8 °C)  Current: Temperature: 98 3 °F (36 8 °C)    Intake/Output Summary (Last 24 hours) at 3/3/2022 0730  Last data filed at 3/3/2022 0701  Gross per 24 hour   Intake 1900 ml   Output 1850 ml   Net 50 ml     Body mass index is 25 27 kg/m²  Physical Exam  General Appearance:  Awake,alert, cooperative  Not in distress  Pallor / Icterus/ Cyanosis negative  Oropharynx no thrush  Tongue protrudes in midline and appears dry  Head:  Normocephalic, atraumatic  No titubations  Eyes:  No eye redness or discharge bilaterally  Neck: Supple, no raised JVP  Back:   Symmetric, no kypho-scoliosis  Lungs:   B/L Clear to auscultation, no wheezing or rhonchi  Normal broncho-alveolar air entry  No pleural rubs  Heart:   Regular S1S2, A2P2 normal, no murmur or gallop  Abdomen:   Soft, non-tender,no rebound, bowel sounds+  Musculoskeletal: Extremities no cyanosis or edema  Surgical site on the operated left hip has clean dressing  No discharge or drainage  Mild diminution and tenderness to touch, limited range of motion   Psych: Normal Affect / No hallucinations or delusions  Neurologic:           Skin:    Endocrine:  Vascular: Awake and alert  Mentation intact  Speech is intact  Facial symmetry is maintained     Muscle strength is 5/5 in all muscle groups except on operated limb which is limited due to recent surgery  Light touch and temperature sensation is maintained  No rashes /purpura  No open wounds  Tattoos are noted on his right shoulder and left arm  No thyromegaly / no lid lag  Homans sign is negative  B/L Calf are supple and non tender         Labs, Imaging, & Other studies:    All pertinent labs and imaging studies were personally reviewed  Results from last 7 days   Lab Units 03/03/22  0538   WBC Thousand/uL 13 41*   HEMOGLOBIN g/dL 11 6*   PLATELETS Thousands/uL 241     Results from last 7 days   Lab Units 03/03/22  0538   POTASSIUM mmol/L 3 7   CHLORIDE mmol/L 105   CO2 mmol/L 28   BUN mg/dL 15   CREATININE mg/dL 0 98   EGFR ml/min/1 73sq m 99   CALCIUM mg/dL 8 3           Current Meds:  Current Facility-Administered Medications   Medication Dose Route Frequency Provider Last Rate Last Admin    acetaminophen (TYLENOL) tablet 650 mg  650 mg Oral Q6H PRN FAREED Smith-SHILPI        aspirin tablet 325 mg  325 mg Oral BID Sung Hayes PA-C   325 mg at 03/02/22 1700    docusate sodium (COLACE) capsule 100 mg  100 mg Oral BID Sung Hayes PA-C   100 mg at 03/02/22 1700    ferrous sulfate tablet 325 mg  325 mg Oral Daily With Breakfast Sung Hayes PA-C        HYDROmorphone (DILAUDID) injection 0 5 mg  0 5 mg Intravenous Q2H PRN FAREED Smith-C   0 5 mg at 03/03/22 0001    ketorolac (TORADOL) injection 15 mg  15 mg Intravenous Q6H PRN Sung Hayes PA-C        lactated ringers bolus 1,000 mL  1,000 mL Intravenous Once PRN Sung Hayes PA-C        And    lactated ringers bolus 1,000 mL  1,000 mL Intravenous Once PRN Sung Hayes PA-C   Stopped at 03/02/22 1510    lactated ringers infusion  100 mL/hr Intravenous Continuous Sung Hayes PA-C 100 mL/hr at 03/03/22 0024 100 mL/hr at 03/03/22 0024    ondansetron (ZOFRAN) injection 4 mg  4 mg Intravenous Q6H PRN Sung Hayes PA-C        oxyCODONE (ROXICODONE) immediate release tablet 10 mg  10 mg Oral Q4H PRN Francesca Veronica, PA-C   10 mg at 03/02/22 2047    oxyCODONE (ROXICODONE) IR tablet 5 mg  5 mg Oral Q4H PRN Francesca Beatmaritza, PA-C   5 mg at 03/02/22 1657    pantoprazole (PROTONIX) EC tablet 40 mg  40 mg Oral Daily Before Breakfast Lisbeth Marin MD        [facility]) tablet 8 6 mg  1 tablet Oral Daily Francesca Beatmaritza, PA-C        sodium chloride 0 9 % bolus 1,000 mL  1,000 mL Intravenous Once PRN Francesca Beath, PA-C        And    sodium chloride 0 9 % bolus 1,000 mL  1,000 mL Intravenous Once PRN Francesca Beath, PA-C        sodium chloride 0 9 % infusion  125 mL/hr Intravenous Continuous Stalin Alessio, DO   Stopped at 03/02/22 1300     Home Meds:  Medications Prior to Admission   Medication    acetaminophen (TYLENOL) 325 mg tablet    mupirocin (BACTROBAN) 2 % ointment    diclofenac sodium (VOLTAREN) 50 mg EC tablet    MAGNESIUM PO    pantoprazole (PROTONIX) 40 mg tablet    TURMERIC PO       Continuous Infusions:  lactated ringers, 100 mL/hr, Last Rate: 100 mL/hr (03/03/22 0024)  sodium chloride, 125 mL/hr, Last Rate: Stopped (03/02/22 1300)        Invasive Devices  Report    Peripheral Intravenous Line            Peripheral IV 03/02/22 Left Hand <1 day                VTE Pharmacologic Prophylaxis: Asprin 325mg BID as per Primary Ortho Team   VTE Mechanical Prophylaxis: sequential compression device    Portions of the record may have been created with voice recognition software  Occasional wrong word or "sound a like" substitutions may have occurred due to the inherent limitations of voice recognition software  Read the chart carefully and recognize, using context, where substitutions have occurred

## 2022-03-03 NOTE — PLAN OF CARE
Problem: PHYSICAL THERAPY ADULT  Goal: Performs mobility at highest level of function for planned discharge setting  See evaluation for individualized goals  Description: Treatment/Interventions: Functional transfer training,LE strengthening/ROM,Elevations,Therapeutic exercise,Endurance training,Patient/family training,Equipment eval/education,Bed mobility,Gait training,Compensatory technique education,Continued evaluation,Spoke to nursing,Family          See flowsheet documentation for full assessment, interventions and recommendations  Note: Prognosis: Good  Problem List: Decreased strength,Impaired balance,Decreased mobility,Decreased endurance,Decreased skin integrity,Pain  Assessment: Mackenzie Mcqueen was seen for a f/u session now POD#1 L ant THR  Reporting severe pain in L hip today however agreeable to session  Pain w no significant impact on mobility, pt still able to perform majority of tasks at S level and ambulate community distances w RW  However activity tolerance limited by onset of lightheadedness as well as diaphoresis  Vitals recorded during session as 134/80 pre amb, 106/53 seated post amb  Defer trial of stairs this session secondary to safety concerns/ persistent sx despite repositioning, rest  At this time pt not cleared for dc from PT standpoint; would benefit from stair trial prior to dc  Barriers to Discharge: Inaccessible home environment  Barriers to Discharge Comments: steps     PT Discharge Recommendation: Home with outpatient rehabilitation          See flowsheet documentation for full assessment

## 2022-03-03 NOTE — OCCUPATIONAL THERAPY NOTE
Occupational Therapy Progress Note(time=7885-1632)     Patient Name: Jorge Mcdowell  Today's Date: 3/3/2022  Problem List  Principal Problem:    Primary osteoarthritis of left hip       03/03/22 1420   Note Type   Note Type Treatment   Restrictions/Precautions   Weight Bearing Precautions Per Order Yes   LLE Weight Bearing Per Order WBAT   Other Precautions Fall Risk;Pain   Pain Assessment   Pain Assessment Tool FLACC   Pain Rating: FLACC (Rest) - Face 0   Pain Rating: FLACC (Rest) - Legs 0   Pain Rating: FLACC (Rest) - Activity 0   Pain Rating: FLACC (Rest) - Cry 1   Pain Rating: FLACC (Rest) - Consolability 0   Score: FLACC (Rest) 1   ADL   Where Assessed Chair   Equipment Provided Reacher;Sock aid;Long-handled shoe horn;Long-handled sponge;Dressing stick   LB Dressing Assistance 5  Supervision/Setup   LB Dressing Deficit Don/doff R sock; Don/doff L sock; Thread RLE into pants; Thread LLE into pants   Functional Standing Tolerance   Time 1-2 mins   Activity donning pants   Transfers   Sit to Stand 5  Supervision   Additional items Increased time required;Verbal cues   Stand to Sit 5  Supervision   Additional items Increased time required;Verbal cues   Functional Mobility   Functional Mobility 5  Supervision   Additional items Rolling walker   Cognition   Overall Cognitive Status WFL   Arousal/Participation Alert   Attention Within functional limits   Following Commands Follows one step commands without difficulty   Activity Tolerance   Activity Tolerance Patient limited by fatigue;Patient limited by pain   Medical Staff Made Aware nsg   Assessment   Assessment Pt seen for 25min tx session with focus on functional mobility, functional balance, ADLs, and education  Therapist reviewed car/shower transfers, sleeping positions, edema control techniques(i e ice, elevation, TEDS), and LE adaptive equipment(i e hip kit)  Pt able to demonstrate functional use of "hip kit" with LE ADLs; pt declining purchasing of equipment   Pt states no concerns about going directly home  Tx tolerated well  Will continue  Plan   Treatment Interventions ADL retraining;Functional transfer training;Patient/family training;Equipment evaluation/education; Compensatory technique education   Goal Expiration Date 03/10/22   OT Treatment Day 1   OT Frequency 2-3x/wk   Recommendation   OT Discharge Recommendation Home with outpatient rehabilitation  (outpt P T )   AM-PAC Daily Activity Inpatient   Lower Body Dressing 3   Bathing 3   Toileting 4   Upper Body Dressing 4   Grooming 4   Eating 4   Daily Activity Raw Score 22   Daily Activity Standardized Score (Calc for Raw Score >=11) 47  [address] [facility]   Following a Speech/Presentation 4   Understanding Ordinary Conversation 4   Taking Medications 4   Remembering Where Things Are Placed or Put Away 4   Remembering List of 4-5 Errands 4   Taking Care of Complicated Tasks 4   Applied Cognition Raw Score 24   Applied Cognition Standardized Score 62 21   Coni Márquez, OT

## 2022-03-03 NOTE — PROGRESS NOTES
Orthopedic Total Hip Progress Note    ASSESSMENT & PLAN:    Status post- LEFT total hip arthroplasty: LOS: 0 days      Doing well postoperatively  Pain Relief: Patient doing well with pain medications  Comfortable  Continues current post-op course  Activity: Patient may be WBAT  Getting up to bathroom by self  Weight Bearing: Weight bearing as tollerated      SUBJECTIVE:    Systemic or Specific Complaints: No Complaints    OBJECTIVE:  Vital signs in last 24 hours:  Temp:  [97 8 °F (36 6 °C)-98 3 °F (36 8 °C)] 98 3 °F (36 8 °C)  HR:  [40-80] 80  Resp:  [13-18] 16  BP: ()/(48-69) 111/63    General: alert, appears stated age and cooperative   Neurovascular: Tibial nerve: Intact, Superficial peroneal nerve: Intact and Deep peroneal nerve: Intact  Dorsalis pedis pulse: 2+, Posterior tibial pulse: 2+ and Capillary refill: Normal   Wound: No Erythema   Range of Motion: Normal and As expected post THR   DVT Exam: Negative Shira's sign  No cords or calf tenderness  No significant calf/ankle edema  Data Review  CBC:   Lab Results   Component Value Date    WBC 13 41 (H) 03/03/2022    RBC 3 86 (L) 03/03/2022    HGB 11 6 (L) 03/03/2022    HCT 34 3 (L) 03/03/2022     03/03/2022     Plan: Mobilize with PT / OT  DVT prophylaxis   D/C Planning for Disposition - Plan to d/c home today if doing well  Plan to start PT at home  WBAT  Okay to shower today before dc  ?   Abrahan Henry PA-C  Date: 3/3/2022  Time: 7:37 AM

## 2022-03-03 NOTE — PLAN OF CARE
Problem: OCCUPATIONAL THERAPY ADULT  Goal: Performs self-care activities at highest level of function for planned discharge setting  See evaluation for individualized goals  Description: Treatment Interventions: ADL retraining,Functional transfer training,Patient/family training,Equipment evaluation/education,Compensatory technique education  Equipment Recommended: Hip Kit ($) (RW)       See flowsheet documentation for full assessment, interventions and recommendations  3/3/2022 1531 by Lluvia Huynh OT  Outcome: Adequate for Discharge  Note: Limitation: Decreased ADL status,Decreased endurance,Decreased Safe judgement during ADL,Decreased high-level ADLs  Prognosis: Good  Assessment: Pt seen for 25min tx session with focus on functional mobility, functional balance, ADLs, and education  Therapist reviewed car/shower transfers, sleeping positions, edema control techniques(i e ice, elevation, TEDS), and LE adaptive equipment(i e hip kit)  Pt able to demonstrate functional use of "hip kit" with LE ADLs; pt declining purchasing of equipment  Pt states no concerns about going directly home  Tx tolerated well  Will continue  OT Discharge Recommendation: Home with outpatient rehabilitation (outpt P T )       3/3/2022 1202 by Lluvia Huynh OT  Note: Limitation: Decreased ADL status,Decreased endurance,Decreased Safe judgement during ADL,Decreased high-level ADLs  Prognosis: Good  Assessment: Pt is a 36y/o male admitted to the hospital for an elected s/p L THR--anterior approach(3/2)  Pt is currently WBAT with his L LE, having no restrictions  Pt with PMH hip arthroscopy, hip dysplasia, lumbar microdiscectomy, and GERD  PTA pt states independence with all aspects of his ADLs, transfers, ambulation--w/o device; +, neg home alone  During initial eval, pt demonstrated slight deficits with his functional balance, functional mobility, and ADL status   Pt would benefit from continued OT tx for the above deficits  1-5 OT tx sessions        OT Discharge Recommendation: Home with outpatient rehabilitation

## 2022-03-03 NOTE — PHYSICAL THERAPY NOTE
PHYSICAL THERAPY NOTE          Patient Name: Asa Harrison  QMTJI'D Date: 3/3/2022   Time: 6483-3389       03/03/22 0919   PT Last Visit   PT Visit Date 03/03/22   Note Type   Note Type BID visit/treatment   Pain Assessment   Pain Assessment Tool 0-10   Pain Score 8   Pain Location/Orientation Orientation: Left; Location: Hip   Hospital Pain Intervention(s) Cold applied;Repositioned; Ambulation/increased activity; Elevated; Emotional support; Rest   Restrictions/Precautions   LLE Weight Bearing Per Order WBAT   Other Precautions Multiple lines; Fall Risk;Pain   General   Chart Reviewed Yes   Additional Pertinent History POD#1 L ant THR   Response to Previous Treatment Patient with no complaints from previous session  Cognition   Overall Cognitive Status WFL   Arousal/Participation Cooperative   Attention Within functional limits   Orientation Level Oriented X4   Memory Within functional limits   Following Commands Follows all commands and directions without difficulty   Subjective   Subjective "I feel lightheaded"   Transfers   Sit to Stand 5  Supervision   Additional items Increased time required;Verbal cues; Other;Armrests; Bedrails  (RW)   Stand to Sit 5  Supervision   Additional items Armrests; Increased time required;Verbal cues; Other  (RW)   Additional Comments cues for hand placement and position of LLE for comfort   Ambulation/Elevation   Gait pattern Improper Weight shift; Wide TITO; Antalgic   Gait Assistance 5  Supervision   Additional items Assist x 1   Assistive Device Rolling walker   Distance >300' w x1 seated rest towards end   Stair Management Assistance Not tested   Balance   Static Standing Fair   Dynamic Standing Fair -   Ambulatory Fair -   Endurance Deficit   Endurance Deficit Yes   Endurance Deficit Description pt reporting feeling lightheaded, sweaty during ambulation  vitals recorded during session as 134/80 EOB pre amb  106/53 seated (seated rest break during amb)  106/44 seated post amb/ end of session   Activity Tolerance   Activity Tolerance Treatment limited secondary to medical complications (Comment)   Nurse Made Aware Flor RN   Assessment   Prognosis Good   Problem List Decreased strength; Impaired balance;Decreased mobility; Decreased endurance;Decreased skin integrity;Pain   Assessment Drew De Anda was seen for a f/u session now POD#1 L ant THR  Reporting severe pain in L hip today however agreeable to session  Pain w no significant impact on mobility, pt still able to perform majority of tasks at S level and ambulate community distances w RW  However activity tolerance limited by onset of lightheadedness as well as diaphoresis  Vitals recorded during session as 134/80 pre amb, 106/53 seated post amb  Defer trial of stairs this session secondary to safety concerns/ persistent sx despite repositioning, rest  At this time pt not cleared for dc from PT standpoint; would benefit from stair trial prior to dc  Barriers to Discharge Inaccessible home environment   Barriers to Discharge Comments steps   Goals   Patient Goals get back to [place]   [facility] Expiration Date 03/05/22   Short Term Goal #1 1)  Pt will perform bed mobility with Karlo demonstrating appropriate technique 100% of the time in order to improve function  2)  Perform all transfers with Karlo demonstrating safe and appropriate technique 100% of the time in order to improve ability to negotiate safely in home environment  3) Amb with least restrictive AD > 150'x1 with mod I in order to demonstrate ability to negotiate in home environment  4)  Improve overall strength and balance 1/2 grade in order to optimize ability to perform functional tasks and reduce fall risk  5) Increase activity tolerance to 45 minutes in order to improve endurance to functional tasks  6)  Negotiate stairs using most appropriate technique and mod I in order to be able to negotiate safely in home environment  7) PT for ongoing patient and family/caregiver education, DME needs and d/c planning in order to promote highest level of function in least restrictive environment  PT Treatment Day 1   Plan   Treatment/Interventions Functional transfer training;LE strengthening/ROM; Elevations; Therapeutic exercise; Endurance training;Patient/family training;Equipment eval/education;Gait training;Bed mobility; Compensatory technique education;Continued evaluation;Spoke to nursing;OT   Progress Progressing toward goals   PT Frequency Twice a day   Recommendation   PT Discharge Recommendation Home with outpatient rehabilitation   AM-PAC Basic Mobility Inpatient   Turning in Bed Without Bedrails 3   Lying on Back to Sitting on Edge of Flat Bed 3   Moving Bed to Chair 3   Standing Up From Chair 3   Walk in Room 3   Climb 3-5 Stairs 3   Basic Mobility Inpatient Raw Score 18   Basic Mobility Standardized Score 41 05   Highest Level Of Mobility   JH-HLM Goal 6: Walk 10 steps or more   JH-HLM Highest Level of Mobility 8: Walk 250 feet ot more   JH-HLM Goal Achieved Yes   Education   Education Provided Mobility training;Assistive device   Patient Demonstrates acceptance/verbal understanding   End of Consult   Patient Position at End of Consult Bedside chair; All needs within reach   End of Consult Comments not cleared for dc from PT standpoint     Terry Dumont, PT

## 2022-03-03 NOTE — OCCUPATIONAL THERAPY NOTE
Occupational Therapy Evaluation(time=0514-1617)     Patient Name: Nathaly Miller  Today's Date: 3/3/2022  Problem List  Principal Problem:    Primary osteoarthritis of left hip    Past Medical History  Past Medical History:   Diagnosis Date    GERD (gastroesophageal reflux disease)      Past Surgical History  Past Surgical History:   Procedure Laterality Date    HIP ARTHROSCOPY Left     MICRODISCECTOMY LUMBAR  01/2021    GA TOTAL HIP ARTHROPLASTY Left 3/2/2022    Procedure: ANTERIOR TOTAL HIP REPLACEMENT;  Surgeon: Silvina Jain MD;  Location: [facility];  Service: Orthopedics             03/03/22 0850   Note Type   Note type Evaluation   Restrictions/Precautions   Weight Bearing Precautions Per Order Yes   LLE Weight Bearing Per Order WBAT   Other Precautions Fall Risk;Pain  (no restrictions per ortho)   Pain Assessment   Pain Assessment Tool 0-10   Pain Score 8   Pain Location/Orientation Location: Hip;Orientation: Left   Home Living   Type of [address] [facility] One level   Bathroom Shower/Tub Walk-in shower   Bathroom Toilet Standard   Home Equipment Walker;Cane   Prior Function   Lives With Spouse   ADL Assistance Independent   Falls in the last 6 months 1 to 4   Lifestyle   Autonomy PTA pt states independence with all aspects of his ADLs, transfers, ambulation--w/o device; +, neg home alone   Reciprocal Relationships supportive wife   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    Psychosocial   Psychosocial (WDL) WDL   Subjective   Subjective "I have been dealing with this bad hip for a while "   ADL   Where Assessed Edge of bed   Eating Assistance 6  Modified independent   Grooming Assistance 6  Modified Independent   UB Bathing Assistance 5  Supervision/Setup   LB Bathing Assistance 4  Minimal Assistance   [address]  [address]  [address] 4 Minimal assistance   Additional items Assist x 1; Increased time required;Verbal cues;LE management   Rolling L 4  Minimal assistance   Additional items Assist x 1; Increased time required;Verbal cues;LE management   Supine to Sit 4  Minimal assistance   Additional items Assist x 1; Increased time required;Verbal cues;LE management   Transfers   Sit to Stand 5  Supervision   Additional items Increased time required;Verbal cues   Stand to Sit 5  Supervision   Additional items Increased time required;Verbal cues   Additional Comments bp's=106/44(EOB), 106/53(after standing)   Functional Mobility   Functional Mobility 5  Supervision   Additional items Rolling walker   Balance   Static Sitting Good   Dynamic Sitting Fair +   Static Standing Fair   Dynamic Standing Fair -   Activity Tolerance   Activity Tolerance Patient limited by fatigue;Patient limited by pain   Medical Staff Made Aware nsg, P T     RUE Assessment   RUE Assessment WFL   RUE Strength   RUE Overall Strength Within Functional Limits - strength 5/5   LUE Assessment   LUE Assessment WFL   LUE Strength   LUE Overall Strength Within Functional Limits - strength 5/5   Hand Function   Gross Motor Coordination Functional   Fine Motor Coordination Functional   Sensation   Light Touch No apparent deficits   Proprioception   Proprioception No apparent deficits   Vision-Basic Assessment   Current Vision   (no glasses noted)   Vision - Complex Assessment   Acuity Able to read clock/calendar on wall without difficulty   Perception   Inattention/Neglect Appears intact   Cognition   Overall Cognitive Status WFL   Arousal/Participation Alert   Attention Within functional limits   Orientation Level Oriented X4   Memory Within functional limits   Following Commands Follows all commands and directions without difficulty   Assessment   Limitation Decreased ADL status; Decreased endurance;Decreased Safe judgement during ADL;Decreased high-level ADLs   Prognosis Good   Assessment Pt is a 38y/o male admitted to the hospital for an elected s/p L THR--anterior approach(3/2)  Pt is currently WBAT with his L LE, having no restrictions  Pt with PMH hip arthroscopy, hip dysplasia, lumbar microdiscectomy, and GERD  PTA pt states independence with all aspects of his ADLs, transfers, ambulation--w/o device; +, neg home alone  During initial eval, pt demonstrated slight deficits with his functional balance, functional mobility, and ADL status  Pt would benefit from continued OT tx for the above deficits  1-5 OT tx sessions  Goals   Patient Goals "to get back to my hobbies "   STG Time Frame   (1-7 days)   Short Term Goal #1 Pt will demonstrate proper walker/transfer safety 100% of the time  Short Term Goal #2 Pt will demonstrate g/g- balance with all functional activities  Short Term Goal  Pt will demonstrate mod I with their UE and LE bathing/dresssing  Long Term Goal #1 Pt will demonstrate mod I with their bed mobility to facilitate EOB ADLs  Long Term Goal #2 Pt will demonstrate mod I with their sit-stand transfers to assist with completion of their LE dressing  Plan   Treatment Interventions ADL retraining;Functional transfer training;Patient/family training;Equipment evaluation/education; Compensatory technique education   Goal Expiration Date 03/10/22   OT Treatment Day 0   OT Frequency 2-3x/wk   Recommendation   OT Discharge Recommendation Home with outpatient rehabilitation   Equipment Recommended Hip Kit ($)  (RW)   AM-PAC Daily Activity Inpatient   Lower Body Dressing 3   Bathing 3   Toileting 4   Upper Body Dressing 4   Grooming 4   Eating 4   Daily Activity Raw Score 22   Daily Activity Standardized Score (Calc for Raw Score >=11) 47  1   AM-PAC Applied Cognition Inpatient   Following a Speech/Presentation 4   Understanding Ordinary Conversation 4   Taking Medications 4   Remembering Where Things Are Placed or Put Away 4   Remembering List of 4-5 Errands 4   Taking Care of Complicated Tasks 4   Applied Cognition Raw Score 24   Applied Cognition Standardized Score 62 21   Dejah Soler, OT

## 2022-03-03 NOTE — PHYSICAL THERAPY NOTE
PHYSICAL THERAPY NOTE          Patient Name: Herson Blas  WNECB'J Date: 3/3/2022   Time: 5120-21531869 03/03/22 1332   PT Last Visit   PT Visit Date 03/03/22   Note Type   Note Type BID visit/treatment   Pain Assessment   Pain Assessment Tool 0-10   Pain Score 6   Pain Location/Orientation Orientation: Left;Orientation: Upper; Location: Leg   Hospital Pain Intervention(s) Cold applied;Repositioned; Ambulation/increased activity; Elevated; Emotional support; Rest   Restrictions/Precautions   LLE Weight Bearing Per Order WBAT   Other Precautions Fall Risk;Pain   General   Chart Reviewed Yes   Additional Pertinent History POD#1 L ant THR   Response to Previous Treatment Patient with no complaints from previous session  Cognition   Overall Cognitive Status WFL   Arousal/Participation Cooperative   Transfers   Sit to Stand 5  Supervision   Additional items Armrests; Increased time required   Stand to Sit 5  Supervision   Additional items Armrests; Increased time required   Ambulation/Elevation   Gait pattern Improper Weight shift; Wide TITO; Antalgic   Gait Assistance 5  Supervision   Additional items Assist x 1   Assistive Device Rolling walker   Distance >300'   Stair Management Assistance 5  Supervision   Additional items Assist x 1;Verbal cues; Increased time required   Stair Management Technique Two rails; Step to pattern; Foreward   Number of Stairs 20   Ambulation/Elevation Additional Comments  steps 5 steps x4   Balance   Static Standing Fair   Dynamic Standing Fair -   Ambulatory Fair -   Endurance Deficit   Endurance Deficit No   Endurance Deficit Description /68 pre ambulation   Activity Tolerance   Activity Tolerance Patient tolerated treatment well   Nurse Made Aware Flor SKINNER   Exercises   Heelslides Supine;5 reps; Left   Glute Sets Supine;5 reps;Bilateral   Knee AROM Long Arc [employer] reps; Left   Assessment   Prognosis Good   Problem List Decreased strength; Impaired balance;Decreased mobility; Decreased skin integrity;Pain   Assessment Sanam Brown was seen for a f/u session now POD#1 L ant THR  Continues to report moderate pain now primarily in the thigh area  Despite pain pt tolerated mobility well improvements in pain over time  Continues to be able to ambulate community distances and negotiate steps to simulate home environment  Denies concerns w dc home from functional standpoint  Given brief seated HEP which pt demonstrated understanding to  Cleared for dc from PT standpoint  Barriers to Discharge None   Goals   Patient Goals go home today   STG Expiration Date 03/05/22   Short Term Goal #1 1)  Pt will perform bed mobility with Karlo demonstrating appropriate technique 100% of the time in order to improve function  2)  Perform all transfers with Karlo demonstrating safe and appropriate technique 100% of the time in order to improve ability to negotiate safely in home environment  3) Amb with least restrictive AD > 150'x1 with mod I in order to demonstrate ability to negotiate in home environment  4)  Improve overall strength and balance 1/2 grade in order to optimize ability to perform functional tasks and reduce fall risk  5) Increase activity tolerance to 45 minutes in order to improve endurance to functional tasks  6)  Negotiate stairs using most appropriate technique and mod I in order to be able to negotiate safely in home environment  7) PT for ongoing patient and family/caregiver education, DME needs and d/c planning in order to promote highest level of function in least restrictive environment  PT Treatment Day 2   Plan   Treatment/Interventions Functional transfer training;LE strengthening/ROM; Elevations; Therapeutic exercise;Patient/family training;Equipment eval/education; Bed mobility;Gait training; Compensatory technique education;Continued evaluation;Spoke to nursing   Progress Progressing toward goals   PT Frequency Other (Comment)  (QID-BID)   Recommendation   PT Discharge Recommendation Home with outpatient rehabilitation   Additional Comments The patient's AM-PAC Basic Mobility Inpatient Short Form Raw Score is 20  A Raw score of greater than 16 suggests the patient may benefit from discharge to home  Please also refer to the recommendation of the Physical Therapist for safe discharge planning     AM-PAC Basic Mobility Inpatient   Turning in Bed Without Bedrails 4   Lying on Back to Sitting on Edge of Flat Bed 4   Moving Bed to Chair 3   Standing Up From Chair 3   Walk in Room 3   Climb 3-5 Stairs 3   Basic Mobility Inpatient Raw Score 20   Basic Mobility Standardized Score 43 99   Highest Level Of Mobility   -[facility] Goal 6: Walk 10 steps or more     Julia Husbands, PT

## 2022-03-03 NOTE — CASE MANAGEMENT
Case Management Discharge Planning Note    Patient name Neal Bay  Location [facility] /E2 -* MRN [id number]  : 1985 Date 3/3/2022       Current Admission Date: 3/2/2022  Current Admission Diagnosis:Primary osteoarthritis of left hip   Patient Active Problem List    Diagnosis Date Noted    Primary osteoarthritis of left hip 2022      LOS (days): 0  Geometric Mean LOS (GMLOS) (days):   Days to GMLOS:     OBJECTIVE:     Current admission status: Outpatient Surgery   Preferred Pharmacy:   [address] , [address]  Phone: [phone] Fax: [phone]    Primary Care Provider: VIANEY Pabon    Primary Insurance: BLUE CROSS  Secondary Insurance:     DISCHARGE DETAILS:    [address]         Is the patient interested in [facility] at discharge?: No    DME Referral Provided  Referral made for DME?: No (has RW and hip kit provided by OT )    Other Referral/Resources/Interventions Provided:  Interventions: Outpatient PT  Referral Comments: Pt has OPPT arranged starting next Tuesday      Treatment Team Recommendation: Home  Discharge Destination Plan[de-identified] Home  Transport at Discharge : Family

## 2022-03-03 NOTE — CONSULTS
Consultation - Internal Medicine  Raghu Paget 30 y o  male MRN: [id number]  Unit/Bed#: E2 -01 Encounter: [id number]      Impression :-     30 y o  male patient, who has undergone elective left hip replacement  Left hip degenerative joint disease  Ambulatory dysfunction  Bilateral hip dysplasia     Recommendation: -    Continue postoperative care as recommended by orthopedic team I have reviewed patients medications as initiated on post operative orders by the primary team  Recommend  monitoring closely for any hypoxemia / respiratory insufficieny related to narcotics and to reduce doses and frequency of narcotics if necessary  Maintain Intravenous Fluids, till patient able to reliably keep meals and meds in  Suggest  Zofran ODT 4 mg sublingually PRN for control of post operative nausea  Patient encouraged to  use Incentive spirometry q 15 minutes while awake to minimize risk of postoperative atelectasis and pneumonia  Patient verbalized to understand and fully comprehend  Recommend DVT prophylaxis with use of Venodyne compression boots  If any factor X A inhibitor,  low molecule weight heparin or Coumadin is planned by the primary team, we will be happy to dose that  drug based on patient's hemostasis  Maintain ASA as antiplatelet drug per Ortho  Team  Use Proton Pump inhibitor to minimize risk of gastritis  Monitor for any tinnitus as an early sign of salicylism and check salicylate levels in that situation  Discontinue patient's Zambrano catheter within next 24-48 hours in order  to minimize risk of catheter associated UTI  Please check patient's hemoglobin and hematocrit within next 24 hours to ensure that there is no acute blood loss anemia which would need any blood transfusion  We will follow this pleasant patient with your service closely and recommend necessary changes based on  further hospital course and diagnostics  Thank you, Farzad Spencer , for your kind consultation  HISTORY OF PRESENT ILLNESS:    Reason for Consult:  Post operative management following elective left hip replacement  HPI: Cynthia Lora is a 30 y o  male, who moved from [place] to [facility] 2 years ago  He has history of having hip dysplasia this and or past year and a half he has been increasingly having discomfort in his both hips left more than right  According to him he has been followed by orthopedic team and had imaging studies done very was found to have advanced arthritis related to his hip dysplasia  He had tried various conservative treatments but did not work and now he underwent the surgery without any complications  Outpatient records were reviewed  Patient denies any specific history of any irritable bowel syndrome or any rheumatic disorder  He denies any injury trauma  States that he was pretty active with sports when he was in [place]  Over last 6 months or so he was having difficulty with weight-bearing and was taking pain medications  It was getting hard for simple activities including getting in and out of the car  At present he is resting denies any pain and has been using ice packs locally  Review of Systems   Constitutional:  No chills, diaphoresis, fatigue or fever  HEENT:  Negative for congestion, sore throat and tinnitus  No visual complaints  Respiratory:  Negative cough, chest tightness, shortness of breath and wheezing  Cardiovascular:  Negative for chest pain and palpitations  Gastrointestinal: Negative for abdominal distention or pain, constipation, diarrhea and nausea  Endocrine: Negative for cold intolerance, heat intolerance, polydipsia and polyuria  Genitourinary:                Negative for  dysuria, flank pain  Tolerating Zambrano without difficulty  Skin:   Negative for color change, pallor and rash      Neurological:   Negative for dizziness, tremors, seizures, syncope   Hematological:  Musculoskeletal:  Psychiatric:  No h/o spontaneous bruising/bleeding  Joint pains as above  Negative for agitation, behavioral problems, confusion      A complete system-based review of systems as discussed with patient is otherwise negative  PAST MEDICAL HISTORY:  Past Medical History:   Diagnosis Date    GERD (gastroesophageal reflux disease)      Past Surgical History:   Procedure Laterality Date    HIP ARTHROSCOPY Left     MICRODISCECTOMY LUMBAR  01/2021       FAMILY HISTORY:  Non-contributory    SOCIAL HISTORY:  Social History     Substance and Sexual Activity   Alcohol Use Yes    Comment: occasionally     Social History     Substance and Sexual Activity   Drug Use Never     Social History     Tobacco Use   Smoking Status Never Smoker   Smokeless Tobacco Never Used       ALLERGIES:  No Known Allergies    MEDICATIONS:  All current active medications have been reviewed    Current Facility-Administered Medications   Medication Dose Route Frequency Provider Last Rate Last Admin    acetaminophen (TYLENOL) tablet 650 mg  650 mg Oral Q6H PRN Rhoderick Brink, PA-C        aspirin tablet 325 mg  325 mg Oral BID Rhoderick Brink, PA-C   325 mg at 03/02/22 1700    ceFAZolin (ANCEF) IVPB (premix in dextrose) 1,000 mg 50 mL  1,000 mg Intravenous Q8H Rhoderick Brink, PA-C 100 mL/hr at 03/02/22 1752 1,000 mg at 03/02/22 1752    docusate sodium (COLACE) capsule 100 mg  100 mg Oral BID Rhoderick Brink, PA-C   100 mg at 03/02/22 1700    [START ON 3/3/2022] ferrous sulfate tablet 325 mg  325 mg Oral Daily With Breakfast Rhoderick Brink, PA-C        HYDROmorphone (DILAUDID) injection 0 5 mg  0 5 mg Intravenous Q2H PRN Rhoderick Brink, PA-C   0 5 mg at 03/02/22 1829    ketorolac (TORADOL) injection 15 mg  15 mg Intravenous Q6H PRN Rhoderick Brink, PA-C        lactated ringers bolus 1,000 mL  1,000 mL Intravenous Once PRN Rhoderick Brink, PA-C        And    lactated ringers bolus 1,000 mL  1,000 mL Intravenous Once PRN Rhoderick Brink, PA-C   Stopped at 03/02/22 1510    lactated ringers infusion  100 mL/hr Intravenous Continuous Boneta Mayotte, PA-C 100 mL/hr at 22 1511 100 mL/hr at 22 1511    ondansetron (ZOFRAN) injection 4 mg  4 mg Intravenous Q6H PRN Boneta Mayotte, PA-C        oxyCODONE (ROXICODONE) immediate release tablet 10 mg  10 mg Oral Q4H PRN Boneta Mayotte, PA-C        oxyCODONE (ROXICODONE) IR tablet 5 mg  5 mg Oral Q4H PRN Boneta Mayotte, PA-C   5 mg at 22 1657    [START ON 3/3/2022] pantoprazole (PROTONIX) EC tablet 40 mg  40 mg Oral Daily Before Breakfast Carlitos Rasmussen MD        [facility]) tablet 8 6 mg  1 tablet Oral Daily Boneta Mayotte, PA-C        sodium chloride 0 9 % bolus 1,000 mL  1,000 mL Intravenous Once PRN Boneta Mayotte, PA-C        And    sodium chloride 0 9 % bolus 1,000 mL  1,000 mL Intravenous Once PRN Boneta Mayotte, PA-C        sodium chloride 0 9 % infusion  125 mL/hr Intravenous Continuous Val De Leon DO   Stopped at 22 1300       Medications Prior to Admission   Medication    acetaminophen (TYLENOL) 325 mg tablet    mupirocin (BACTROBAN) 2 % ointment    diclofenac sodium (VOLTAREN) 50 mg EC tablet    MAGNESIUM PO    pantoprazole (PROTONIX) 40 mg tablet    TURMERIC PO       PHYSICAL EXAM:    Vitals:  Temp:  [97 8 °F (36 6 °C)-98 3 °F (36 8 °C)] 97 8 °F (36 6 °C)  HR:  [40-74] 50  Resp:  [13-18] 18  BP: ()/(48-69) 103/55  SpO2:  [99 %-100 %] 99 %  Temp (24hrs), Av °F (36 7 °C), Min:97 8 °F (36 6 °C), Max:98 3 °F (36 8 °C)  Current: Temperature: 97 8 °F (36 6 °C)  Body mass index is 25 27 kg/m²  General Appearance:    Awake, alert, no distress, appears of stated age  Patient has [place] accent/ pleasant and cooperative   Head:    Normocephalic, without obvious abnormality, atraumatic   Eyes:    Pupils equal in size,conjunctiva/corneas clear, EOM's intact  Ears:    External ear no drainage or redness  Nose:   No evidence of epistaxis/ discharge from nares     Throat: Lips, mucosa, and tongue normal    Neck:   Supple, symmetrical, trachea midline, no JVP  Back:     Symmetric, no curvature, no CVA tenderness   Lungs:     Bilateral air entry is broncho-alveolar and equal        No crepitation or rales  No pleural rub  Heart:    Regular rate and rhythm, S1S2 normal, no murmur, rub  Abdomen:     Soft, non-tender, no masses, no organomegaly   Musculoskeletal:   Extremities appear normal, atraumatic, no cyanosis or edema  Surgical site on the operated left hip has clear dressing / no bleeding or hemorrhage apparent  Vascular:   2+ in Posterior tibialis / dorsalis pedis  Skin:   Skin color, texture, turgor normal, no rashes or lesions   Neurologic:   Oriented/ Awake/ facial symmetry maintained  Speech is intact  Muscle bulk and strength is equivocal in B/L Upper and lower extremities except on operated left lower limb  Light sensation is intact B/l LE  B/L Pl[place] flexion is WNL  LABS, IMAGING, & OTHER STUDIES:  Lab Results:  I have personally reviewed pertinent labs  Imaging Studies:   I have personally reviewed pertinent imaging study reports  Imaging:     XR hip/pelv 1 vw left if performed    Result Date: 3/2/2022  Narrative: LEFT HIP INDICATION:   post op positioning   s/p a total hip replacement  COMPARISON:  3/2/2022 intraoperative exam  VIEWS:  XR HIP/PELV 1 VW LEFT W PELVIS IF PERFORMED FINDINGS: Status post left total hip arthroplasty  No evidence of hardware failure  Alignment and joint spaces preserved  Bones are intact  Bone mineralization is within normal limits  Expected postoperative soft tissue changes  Impression: Expected postoperative appearance of the left hip  Workstation performed: [id number]       EKG, Pathology, and Other Studies:   I have personally reviewed pertinent reports  Portions of the record may have been created with voice recognition software   Occasional wrong word or "sound a like" substitutions may have occurred due to the inherent limitations of voice recognition software  Read the chart carefully and recognize, using context, where substitutions have occurred

## 2022-03-03 NOTE — PLAN OF CARE
Problem: MOBILITY - ADULT  Goal: Maintain or return to baseline ADL function  Description: INTERVENTIONS:  -  Assess patient's ability to carry out ADLs; assess patient's baseline for ADL function and identify physical deficits which impact ability to perform ADLs (bathing, care of mouth/teeth, toileting, grooming, dressing, etc )  - Assess/evaluate cause of self-care deficits   - Assess range of motion  - Assess patient's mobility; develop plan if impaired  - Assess patient's need for assistive devices and provide as appropriate  - Encourage maximum independence but intervene and supervise when necessary  - Involve family in performance of ADLs  - Assess for home care needs following discharge   - Consider OT consult to assist with ADL evaluation and planning for discharge  - Provide patient education as appropriate  Outcome: Progressing  Goal: Maintains/Returns to pre admission functional level  Description: INTERVENTIONS:  - Perform BMAT or MOVE assessment daily    - Set and communicate daily mobility goal to care team and patient/family/caregiver  - Collaborate with rehabilitation services on mobility goals if consulted  - Perform Range of Motion 3 times a day    - Dangle patient 3 times a day  - Stand patient 3 times a day  - Ambulate patient 3  times a day  - Out of bed to chair 3 times a day   - Out of bed for meals 3 times a day  - Out of bed for toileting  - Record patient progress and toleration of activity level   Outcome: Progressing

## 2022-03-03 NOTE — PLAN OF CARE
Problem: PHYSICAL THERAPY ADULT  Goal: Performs mobility at highest level of function for planned discharge setting  See evaluation for individualized goals  Description: Treatment/Interventions: Functional transfer training,LE strengthening/ROM,Elevations,Therapeutic exercise,Endurance training,Patient/family training,Equipment eval/education,Bed mobility,Gait training,Compensatory technique education,Continued evaluation,Spoke to nursing,Family          See flowsheet documentation for full assessment, interventions and recommendations  3/3/2022 1540 by Daniel Renner PT  Outcome: Progressing  Note: Prognosis: Good  Problem List: Decreased strength,Impaired balance,Decreased mobility,Decreased skin integrity,Pain  Assessment: Milton Or was seen for a f/u session now POD#1 L ant THR  Continues to report moderate pain now primarily in the thigh area  Despite pain pt tolerated mobility well improvements in pain over time  Continues to be able to ambulate community distances and negotiate steps to simulate home environment  Denies concerns w dc home from functional standpoint  Given brief seated HEP which pt demonstrated understanding to  Cleared for dc from PT standpoint  Barriers to Discharge: None  Barriers to Discharge Comments: steps     PT Discharge Recommendation: Home with outpatient rehabilitation          See flowsheet documentation for full assessment  3/3/2022 1156 by Daniel Renner PT  Note: Prognosis: Good  Problem List: Decreased strength,Impaired balance,Decreased mobility,Decreased endurance,Decreased skin integrity,Pain  Assessment: Milton Or was seen for a f/u session now POD#1 L ant THR  Reporting severe pain in L hip today however agreeable to session  Pain w no significant impact on mobility, pt still able to perform majority of tasks at S level and ambulate community distances w RW  However activity tolerance limited by onset of lightheadedness as well as diaphoresis   Vitals recorded during session as 134/80 pre amb, 106/53 seated post amb  Defer trial of stairs this session secondary to safety concerns/ persistent sx despite repositioning, rest  At this time pt not cleared for dc from PT standpoint; would benefit from stair trial prior to dc  Barriers to Discharge: Inaccessible home environment  Barriers to Discharge Comments: steps     PT Discharge Recommendation: Home with outpatient rehabilitation          See flowsheet documentation for full assessment

## 2022-03-03 NOTE — PLAN OF CARE
Problem: OCCUPATIONAL THERAPY ADULT  Goal: Performs self-care activities at highest level of function for planned discharge setting  See evaluation for individualized goals  Description: Treatment Interventions: ADL retraining,Functional transfer training,Patient/family training,Equipment evaluation/education,Compensatory technique education  Equipment Recommended: Hip Kit ($) (RW)       See flowsheet documentation for full assessment, interventions and recommendations  Note: Limitation: Decreased ADL status,Decreased endurance,Decreased Safe judgement during ADL,Decreased high-level ADLs  Prognosis: Good  Assessment: Pt is a 36y/o male admitted to the hospital for an elected s/p L THR--anterior approach(3/2)  Pt is currently WBAT with his L LE, having no restrictions  Pt with PMH hip arthroscopy, hip dysplasia, lumbar microdiscectomy, and GERD  PTA pt states independence with all aspects of his ADLs, transfers, ambulation--w/o device; +, neg home alone  During initial eval, pt demonstrated slight deficits with his functional balance, functional mobility, and ADL status  Pt would benefit from continued OT tx for the above deficits  1-5 OT tx sessions        OT Discharge Recommendation: Home with outpatient rehabilitation

## 2022-03-04 NOTE — DISCHARGE SUMMARY
DISCHARGE SUMMARY      Patient Name: Levon Lo  Patient MRN: [id number]  Admitting Provider: Junior Martinez MD  Discharging Provider: No att  providers found  Primary Care Physician at Discharge: Jose Leach, [address] FishSamuel  Admission Date: 3/2/2022       Discharge Date: 3/3/2022    Admission Diagnosis  Primary osteoarthritis of left hip [M16 12]    Discharge Diagnoses  Principal Problem:    Primary osteoarthritis of left hip  Resolved Problems:    * No resolved hospital problems  [facility]  Levon Lo was [facility] to [facility] on [facility]/2/20[facility], with diagnosis of osteoarthritis in his Left hip  On the day of admission, he was brought to surgery, successfully underwent a Left hip replacement  He tolerated the procedure well  Following surgery, he was taken to the recovery room, at which time, there was a consult placed for Dr Sonido Weiss for the patient's medical management  After a short stay in the recovery room, he was transferred to the orthopedic floor at which time, he was resumed on his routine home medications per the hospitalist group  On postop day #1, he was able to start some physical therapy and was able to tolerate it well  At this time, he was in stable condition, showing no sign of deep vein thrombosis or pulmonary embolism  Incision was healing well at the time and showed no signs of infection  DISCHARGE DIAGNOSIS: Primary osteoarthritis of left hip [M16 12]  He is now status post Left total hip replacement, which he underwent during the hospital stay  Medications  See after visit summary for reconciled discharge medications provided to patient and family  Allergies  No Known Allergies    Outpatient Follow-Up  No future appointments        Consults   Medical Management - Dr Maximiliano Garcia Results   Component Value Date    HCT 34 3 (L) 03/03/2022     Lab Results   Component Value Date    CALCIUM 8 3 03/03/2022    K 3 7 03/03/2022    CO2 28 03/03/2022     03/03/2022    BUN 15 03/03/2022    CREATININE 0 98 03/03/2022        Discharge Disposition  Home    Operative Procedures Performed  Procedure(s):  ANTERIOR TOTAL HIP REPLACEMENT    Discharge Instructions  ASA x 6 weeks for DVT prophylaxis   WBAT  Follow up x 3 weeks in the office  Suture ends trimmed POD #10 to skin level  ?     Chani Villarreal PA-C  6:46 PM, 3/4/2022

## 2023-08-02 ENCOUNTER — HOSPITAL ENCOUNTER (OUTPATIENT)
Dept: ULTRASOUND IMAGING | Facility: MEDICAL CENTER | Age: 38
Discharge: HOME | End: 2023-08-02
Payer: COMMERCIAL

## 2023-08-02 DIAGNOSIS — R59.0 LOCALIZED ENLARGED LYMPH NODES: ICD-10-CM

## 2023-08-02 PROCEDURE — 76536 US EXAM OF HEAD AND NECK: CPT

## 2024-02-13 ENCOUNTER — HOSPITAL ENCOUNTER (OUTPATIENT)
Dept: CT IMAGING | Facility: HOSPITAL | Age: 39
Discharge: HOME | End: 2024-02-13
Payer: COMMERCIAL

## 2024-02-13 DIAGNOSIS — R59.1 LYMPHADENOPATHY: ICD-10-CM

## 2024-02-13 PROCEDURE — 70491 CT SOFT TISSUE NECK W/DYE: CPT

## 2024-02-13 PROCEDURE — G1004 CDSM NDSC: HCPCS

## 2024-02-13 RX ADMIN — IOHEXOL 85 ML: 350 INJECTION, SOLUTION INTRAVENOUS at 17:03

## 2024-07-16 RX ORDER — CLONAZEPAM 0.5 MG/1
0.5 TABLET ORAL DAILY PRN
COMMUNITY
Start: 2024-07-08

## 2024-07-16 RX ORDER — VENLAFAXINE HYDROCHLORIDE 37.5 MG/1
37.5 CAPSULE, EXTENDED RELEASE ORAL DAILY
COMMUNITY
Start: 2024-07-02

## 2024-07-17 ENCOUNTER — OFFICE VISIT (OUTPATIENT)
Dept: UROLOGY | Facility: CLINIC | Age: 39
End: 2024-07-17
Payer: COMMERCIAL

## 2024-07-17 VITALS
SYSTOLIC BLOOD PRESSURE: 130 MMHG | HEIGHT: 71 IN | WEIGHT: 169 LBS | OXYGEN SATURATION: 98 % | DIASTOLIC BLOOD PRESSURE: 80 MMHG | HEART RATE: 76 BPM | BODY MASS INDEX: 23.66 KG/M2

## 2024-07-17 DIAGNOSIS — F52.4 PREMATURE EJACULATION: ICD-10-CM

## 2024-07-17 DIAGNOSIS — Z30.2 ENCOUNTER FOR STERILIZATION: Primary | ICD-10-CM

## 2024-07-17 PROCEDURE — 99204 OFFICE O/P NEW MOD 45 MIN: CPT | Performed by: UROLOGY

## 2024-07-17 NOTE — PROGRESS NOTES
7/17/2024    Terry Slater  1985  [id number]    1. Encounter for sterilization  2. Premature ejaculation  Assessment & Plan:  Impression: History of premature ejaculation, request for elective sterilization with vasectomy.    Plan: Today we discussed his semen analysis which does show grossly abnormal morphology and decreased motility.  We discussed that I am unsure if he would be able to have children if he wanted to try.  In the interim he is requesting elective sterilization with vasectomy.  With regards to his premature ejaculation we discussed that this appears to be well treated with the Effexor at this time.  As he weans down the Effexor dose which is the plan in the next year he may find the right balance/titration with premature ejaculation versus inability to ejaculate.  The patient was instructed to take his as needed clonazepam prior to the vasectomy.  The patient states he will consider the vasectomy in the late fall 2024.       History of Present Illness  39 y.o. male with a history of premature ejaculation.  He states that he is going through divorce and his family doctor started him on clonazepam as well as Effexor.  He is currently on 75 mg of Effexor and states that since he has gone on to Effexor he often has delayed time to ejaculation up to 20 minutes with sexual activity.  He is also concerned about fertility and states that many years ago he went to our [facility] and had a semen analysis which showed relatively normal volume, somewhat low concentration, but very low motility and normal morphology of approximately 1%.  He is actually interested in a vasectomy consultation in addition to discussing his history of premature ejaculation.  He is currently  but going through divorce.  He has no children and states he desires no children in the future.  He states that he has very good libido.      AUA Symptom Score  AUA SYMPTOM SCORE      Flowsheet Row Most Recent Value   AUA SYMPTOM SCORE    How  often have you had a sensation of not emptying your bladder completely after you finished urinating? 0 (P)     How often have you had to urinate again less than two hours after you finished urinating? 0 (P)     How often have you found you stopped and started again several times when you urinate? 0 (P)     How often have you found it difficult to postpone urination? 0 (P)     How often have you had a weak urinary stream? 0 (P)     How often have you had to push or strain to begin urination? 0 (P)     How many times did you most typically get up to urinate from the time you went to bed at night until the time you got up in the morning? 1 (P)     Quality of Life: If you were to spend the rest of your life with your urinary condition just the way it is now, how would you feel about that? 0 (P)     AUA SYMPTOM SCORE 1 (P)              Review of Systems    Past Medical History  Past Medical History:   Diagnosis Date   • GERD (gastroesophageal reflux disease)        Past Social History  Past Surgical History:   Procedure Laterality Date   • HIP ARTHROSCOPY Left    • MICRODISCECTOMY LUMBAR  01/2021   • [place] ARTHRP ACETBLR/PROX FEM PROSTC AGRFT/ALGRFT Left 3/2/2022    Procedure: ANTERIOR TOTAL HIP REPLACEMENT;  Surgeon: Sky Coley MD;  Location: [facility];  Service: Orthopedics       Past Family History  History reviewed. No pertinent family history.    Past Social history  Social History     Socioeconomic History   • Marital status: /Civil Union     Spouse name: Not on file   • Number of children: Not on file   • Years of education: Not on file   • Highest education level: Not on file   Occupational History   • Not on file   Tobacco Use   • Smoking status: Never   • Smokeless tobacco: Never   Vaping Use   • Vaping status: Never Used   Substance and Sexual Activity   • Alcohol use: Yes     Comment: occasionally   • Drug use: Never   • Sexual activity: Yes   Other Topics Concern   • Not on file   Social History  "Narrative   • Not on file     Social Determinants of Health     Financial Resource Strain: Not on file   Food Insecurity: Not on file   Transportation Needs: Not on file   Physical Activity: Not on file   Stress: Not on file   Social Connections: Not on file   Intimate Partner Violence: Not on file   Housing Stability: Not on file       Current Medications  Current Outpatient Medications   Medication Sig Dispense Refill   • clonazePAM (KlonoPIN) 0.5 mg tablet Take 0.5 mg by mouth daily as needed     • venlafaxine (EFFEXOR-XR) 37.5 mg 24 hr capsule Take 37.5 mg by mouth daily       No current facility-administered medications for this visit.       Allergies  Allergies   Allergen Reactions   • Other Other (See Comments)       Past Medical History, Social History, Family History, medications and allergies were reviewed.    Vitals  Vitals:    07/17/24 1418   BP: 130/80   BP Location: Right arm   Patient Position: Sitting   Cuff Size: Adult   Pulse: 76   SpO2: 98%   Weight: 76.7 kg (169 lb)   Height: 5' 11\" (1.803 m)       Physical Exam  On examination he is in no acute distress.  His abdomen is soft nontender nondistended.   examination reveals normal phallus, scrotum and scrotal contents.  Testes are slightly small.  Vasa deferentia are easily palpated in the midline.  Skin is warm.  Extremities without edema.  Neurologic is grossly intact and nonfocal.  Gait normal.  Affect normal  Results  No results found for: \"PSA\"  Lab Results   Component Value Date    CALCIUM 9.4 07/06/2024    K 5.0 07/06/2024    CO2 30 07/06/2024     07/06/2024    BUN 14 07/06/2024    CREATININE 0.94 07/06/2024     Lab Results   Component Value Date    WBC 13.41 (H) 03/03/2022    HGB 11.6 (L) 03/03/2022    HCT 34.3 (L) 03/03/2022    MCV 89 03/03/2022     03/03/2022       Office Urine Dip  No results found for this or any previous visit (from the past 1 hour(s)).]  "

## 2024-07-17 NOTE — ASSESSMENT & PLAN NOTE
Impression: History of premature ejaculation, request for elective sterilization with vasectomy.    Plan: Today we discussed his semen analysis which does show grossly abnormal morphology and decreased motility.  We discussed that I am unsure if he would be able to have children if he wanted to try.  In the interim he is requesting elective sterilization with vasectomy.  With regards to his premature ejaculation we discussed that this appears to be well treated with the Effexor at this time.  As he weans down the Effexor dose which is the plan in the next year he may find the right balance/titration with premature ejaculation versus inability to ejaculate.  The patient was instructed to take his as needed clonazepam prior to the vasectomy.  The patient states he will consider the vasectomy in the late fall 2024.

## 2024-07-18 ENCOUNTER — DOCUMENTATION (OUTPATIENT)
Dept: UROLOGY | Facility: CLINIC | Age: 39
End: 2024-07-18

## 2024-11-06 DIAGNOSIS — Z00.6 ENCOUNTER FOR EXAMINATION FOR NORMAL COMPARISON OR CONTROL IN CLINICAL RESEARCH PROGRAM: ICD-10-CM

## 2024-11-07 ENCOUNTER — APPOINTMENT (OUTPATIENT)
Dept: LAB | Facility: CLINIC | Age: 39
End: 2024-11-07

## 2024-11-07 DIAGNOSIS — Z00.6 ENCOUNTER FOR EXAMINATION FOR NORMAL COMPARISON OR CONTROL IN CLINICAL RESEARCH PROGRAM: ICD-10-CM

## 2024-11-07 PROCEDURE — 36415 COLL VENOUS BLD VENIPUNCTURE: CPT

## 2024-11-12 ENCOUNTER — PROCEDURE VISIT (OUTPATIENT)
Dept: UROLOGY | Facility: CLINIC | Age: 39
End: 2024-11-12
Payer: COMMERCIAL

## 2024-11-12 VITALS
OXYGEN SATURATION: 97 % | WEIGHT: 170 LBS | DIASTOLIC BLOOD PRESSURE: 80 MMHG | BODY MASS INDEX: 23.8 KG/M2 | SYSTOLIC BLOOD PRESSURE: 146 MMHG | HEART RATE: 97 BPM | HEIGHT: 71 IN

## 2024-11-12 DIAGNOSIS — Z30.2 ENCOUNTER FOR STERILIZATION: ICD-10-CM

## 2024-11-12 DIAGNOSIS — F52.4 PREMATURE EJACULATION: Primary | ICD-10-CM

## 2024-11-12 PROCEDURE — 88302 TISSUE EXAM BY PATHOLOGIST: CPT | Performed by: STUDENT IN AN ORGANIZED HEALTH CARE EDUCATION/TRAINING PROGRAM

## 2024-11-12 PROCEDURE — 55250 REMOVAL OF SPERM DUCT(S): CPT | Performed by: UROLOGY

## 2024-11-12 RX ORDER — VENLAFAXINE HYDROCHLORIDE 75 MG/1
1 CAPSULE, EXTENDED RELEASE ORAL DAILY
COMMUNITY
Start: 2024-10-30

## 2024-11-12 RX ORDER — HYDROCODONE BITARTRATE AND ACETAMINOPHEN 5; 325 MG/1; MG/1
1 TABLET ORAL
COMMUNITY
Start: 2024-09-26

## 2024-11-12 NOTE — PROGRESS NOTES
Vasectomy     Date/Time  11/12/2024 1:30 PM     Performed by  Edmond Fong MD   Authorized by  Edmond Fong MD           Terry is a 39-year-old male who request elective sterilization with vasectomy.  He is .  He has no children.  He desires no children in the future.      Vasectomy Procedure Note:  Risks and benefits of vasectomy were previously discussed. Informed consent was obtained at his prior office visit. The patient had taken Ativan as prescribed.  The patient was placed in the supine position. His genitalia was prepped and draped in a sterile fashion. The left vas deferens was grasped and presented to the midline of the scrotum. 2% lidocaine was used to anesthetize the skin, subcutaneous tissue, and left vas deferens. A scalpeless opening was made in the midline of the scrotum. The left vas deferens was grasped and presented into the surgical field. A #15 blade was used to make a longitudinal incision in the sheath of the vas deferens. The vas itself was then dissected free from the surrounding tissue. Clamps were placed proximally and distally and a 1 cm segment of the vas deferens was excised. Silk ties were applied and exposed ends were fulgurated. Hemostasis was excellent. The vas was returned to its natural anatomic position and the same procedure was then repeated on the patient's right side. A single stitch was placed through the skin and dartos to reapproximate the defect. Bacitracin ointment was applied.      The patient is status post vasectomy. I recommended rest, ice, and scrotal support. I've asked that he return in the next 2-3 weeks for a post vasectomy check. Tylenol/Advil as needed for pain. The patient understands that he is not yet considered sterile. I recommend a single post vasectomy semen analysis at 8 weeks. In the interim I have recommended contraception to avoid an undesired pregnancy.  During the procedure the patient reported that he took his  regular dose of clonazepam from home prior to the procedure.  He states that he then drove himself for the procedure today.  Although he does not show any signs of impairment, I have recommended an Uber/Lyft for transportation home.  The patient states that he lives approximately 30 minutes away and does not have anyone who would be able to return later to  his car.  He states that he was returning home to work and we provided the patient with a workspace for 2 additional hours prior to him driving home.

## 2024-11-18 PROCEDURE — 88302 TISSUE EXAM BY PATHOLOGIST: CPT | Performed by: STUDENT IN AN ORGANIZED HEALTH CARE EDUCATION/TRAINING PROGRAM

## 2024-11-19 LAB
APOB+LDLR+PCSK9 GENE MUT ANL BLD/T: NOT DETECTED
BRCA1+BRCA2 DEL+DUP + FULL MUT ANL BLD/T: NOT DETECTED
MLH1+MSH2+MSH6+PMS2 GN DEL+DUP+FUL M: NOT DETECTED

## 2025-01-13 ENCOUNTER — RESULTS FOLLOW-UP (OUTPATIENT)
Dept: UROLOGY | Facility: CLINIC | Age: 40
End: 2025-01-13

## 2025-01-13 ENCOUNTER — APPOINTMENT (OUTPATIENT)
Dept: LAB | Facility: CLINIC | Age: 40
End: 2025-01-13
Payer: COMMERCIAL

## 2025-01-13 DIAGNOSIS — Z30.2 ENCOUNTER FOR STERILIZATION: ICD-10-CM

## 2025-01-13 LAB
DEPRECATED CD4 CELLS/CD8 CELLS BLD: 1.8 ML
SPERM MOTILE SMN QL MICRO: NORMAL

## 2025-01-13 PROCEDURE — 89321 SEMEN ANAL SPERM DETECTION: CPT

## 2025-01-13 NOTE — TELEPHONE ENCOUNTER
I called and spoke with patient and explained to him that No viable sperm identified.  Ok for intercourse without contraception. Patient understood everything.            ----- Message from Edmond Fong MD sent at 1/13/2025  2:09 PM EST -----  Ok to call patient for SA results.  No viable sperm identified.  Ok for intercourse without contraception.  Thank you.    FT  ----- Message -----  From: Lab, Background User  Sent: 1/13/2025   8:00 AM EST  To: Edmond Fong MD

## (undated) DEVICE — POSITIONER HANA TABLE PACK

## (undated) DEVICE — 3M™ MICROFOAM™ TAPE 1528-4: Brand: 3M™ MICROFOAM™

## (undated) DEVICE — SUT VICRYL 2-0 CT-1 36 IN J945H

## (undated) DEVICE — GLOVE INDICATOR PI UNDERGLOVE SZ 7 BLUE

## (undated) DEVICE — HOOD: Brand: FLYTE, SURGICOOL

## (undated) DEVICE — FRAZIER SUCTION INSTRUMENT 18 FR W/OBTURATOR, NO CONTROL VENT: Brand: FRAZIER

## (undated) DEVICE — PENCIL ELECTROSURG E-Z CLEAN -0035H

## (undated) DEVICE — SCD SEQUENTIAL COMPRESSION COMFORT SLEEVE MEDIUM KNEE LENGTH: Brand: KENDALL SCD

## (undated) DEVICE — TRAY FOLEY 16FR URIMETER SURESTEP

## (undated) DEVICE — OCCLUSIVE GAUZE STRIP,3% BISMUTH TRIBROMOPHENATE IN PETROLATUM BLEND: Brand: XEROFORM

## (undated) DEVICE — GLOVE SRG BIOGEL 8

## (undated) DEVICE — THE SIMPULSE SOLO SYSTEM WITH ULTREX RETRACTABLE SPLASH SHIELD TIP: Brand: SIMPULSE SOLO

## (undated) DEVICE — WEBRIL 6 IN UNSTERILE

## (undated) DEVICE — PREP SURGICAL PURPREP 26ML

## (undated) DEVICE — GLOVE INDICATOR PI UNDERGLOVE SZ 8 BLUE

## (undated) DEVICE — GAUZE SPONGES,16 PLY: Brand: CURITY

## (undated) DEVICE — SURGICAL GOWN, XL SMARTSLEEVE: Brand: CONVERTORS

## (undated) DEVICE — 3000CC GUARDIAN II: Brand: GUARDIAN

## (undated) DEVICE — INTENDED FOR TISSUE SEPARATION, AND OTHER PROCEDURES THAT REQUIRE A SHARP SURGICAL BLADE TO PUNCTURE OR CUT.: Brand: BARD-PARKER ® CARBON RIB-BACK BLADES

## (undated) DEVICE — 3M™ STERI-STRIP™ REINFORCED ADHESIVE SKIN CLOSURES, R1547, 1/2 IN X 4 IN (12 MM X 100 MM), 6 STRIPS/ENVELOPE: Brand: 3M™ STERI-STRIP™

## (undated) DEVICE — COBAN 6 IN STERILE

## (undated) DEVICE — BETHLEHEM TOTAL HIP, KIT: Brand: CARDINAL HEALTH

## (undated) DEVICE — HEAVY DUTY TABLE COVER: Brand: CONVERTORS

## (undated) DEVICE — SUT MONOCRYL 3-0 PS-2 27 IN Y427H

## (undated) DEVICE — DRAPE C-ARM X-RAY

## (undated) DEVICE — SAW BLADE OSCILLATING BRAZOL 167

## (undated) DEVICE — SUT STRATAFIX SPIRAL PDS PLUS 1 CTX 18 IN SXPP1A400

## (undated) DEVICE — CAPIT HIP COP -CERAMIC ON POLY

## (undated) DEVICE — GLOVE SRG BIOGEL 6.5

## (undated) DEVICE — 6617 IOBAN II PATIENT ISOLATION DRAPE 5/BX,4BX/CS: Brand: STERI-DRAPE™ IOBAN™ 2